# Patient Record
Sex: MALE | Race: OTHER | HISPANIC OR LATINO | URBAN - METROPOLITAN AREA
[De-identification: names, ages, dates, MRNs, and addresses within clinical notes are randomized per-mention and may not be internally consistent; named-entity substitution may affect disease eponyms.]

---

## 2018-10-20 ENCOUNTER — INPATIENT (INPATIENT)
Facility: HOSPITAL | Age: 83
LOS: 2 days | Discharge: NURSING, REHAB OR LONG-TERM CARE | DRG: 641 | End: 2018-10-23
Attending: INTERNAL MEDICINE | Admitting: INTERNAL MEDICINE
Payer: MEDICARE

## 2018-10-20 VITALS
WEIGHT: 177.91 LBS | SYSTOLIC BLOOD PRESSURE: 148 MMHG | TEMPERATURE: 98 F | DIASTOLIC BLOOD PRESSURE: 72 MMHG | HEIGHT: 66 IN | OXYGEN SATURATION: 98 % | HEART RATE: 80 BPM | RESPIRATION RATE: 18 BRPM

## 2018-10-20 RX ORDER — SODIUM CHLORIDE 9 MG/ML
1000 INJECTION INTRAMUSCULAR; INTRAVENOUS; SUBCUTANEOUS ONCE
Qty: 0 | Refills: 0 | Status: COMPLETED | OUTPATIENT
Start: 2018-10-20 | End: 2018-10-20

## 2018-10-20 RX ORDER — SODIUM CHLORIDE 9 MG/ML
3 INJECTION INTRAMUSCULAR; INTRAVENOUS; SUBCUTANEOUS ONCE
Qty: 0 | Refills: 0 | Status: COMPLETED | OUTPATIENT
Start: 2018-10-20 | End: 2018-10-20

## 2018-10-20 RX ADMIN — SODIUM CHLORIDE 3 MILLILITER(S): 9 INJECTION INTRAMUSCULAR; INTRAVENOUS; SUBCUTANEOUS at 23:56

## 2018-10-20 RX ADMIN — SODIUM CHLORIDE 1000 MILLILITER(S): 9 INJECTION INTRAMUSCULAR; INTRAVENOUS; SUBCUTANEOUS at 23:57

## 2018-10-20 NOTE — ED PROVIDER NOTE - OBJECTIVE STATEMENT
89 y/o M patient with a significant PMHx of Arthritis, BPH, Hemorrhoids, HTN and no significant PSHx presents with daughter to the ED with abdominal pain. Patient's daughter reports patient lives in assistant living and is given unknown medication. Patient's daughter states patient took medication and began experiencing constant abdominal pain for x4 days along with a waxing and waning headache. Patient endorses constipation, dry mouth and b/l leg swelling for several months. Patient notes his last BM was yesterday. Patient denies vomiting, chest pain, cough, fever, chills, bloody or dark stool and any other complaints. NKDA.

## 2018-10-20 NOTE — ED PROVIDER NOTE - MEDICAL DECISION MAKING DETAILS
Patient with several chronic complaints. Mainly concerned about abdominal pain for last x3-x4 days, associated with constipation. Patient has chronic dizziness and headache and difficulty ambulating, chronic b/l leg swelling, lungs clear, no CVA tenderness, mild tenderness to RLQ, obtain CT to r/u obstructive appendicitis vs colitis, labs and revaluate.

## 2018-10-20 NOTE — ED ADULT TRIAGE NOTE - CHIEF COMPLAINT QUOTE
BIBA FROM [facility] LIV[facility] with c/o abdominal pain x 4 days and refused to take medications this morning as per ems

## 2018-10-20 NOTE — ED PROVIDER NOTE - PROGRESS NOTE DETAILS
reexamined, feels better, CT neg. Labs w hyponatremia and hypokalemia. Will admit. MAR aware received call from the lab, K-2.3. MAR aware

## 2018-10-21 DIAGNOSIS — Z29.9 ENCOUNTER FOR PROPHYLACTIC MEASURES, UNSPECIFIED: ICD-10-CM

## 2018-10-21 DIAGNOSIS — E87.1 HYPO-OSMOLALITY AND HYPONATREMIA: ICD-10-CM

## 2018-10-21 DIAGNOSIS — M19.90 UNSPECIFIED OSTEOARTHRITIS, UNSPECIFIED SITE: ICD-10-CM

## 2018-10-21 DIAGNOSIS — E87.6 HYPOKALEMIA: ICD-10-CM

## 2018-10-21 DIAGNOSIS — N40.0 BENIGN PROSTATIC HYPERPLASIA WITHOUT LOWER URINARY TRACT SYMPTOMS: ICD-10-CM

## 2018-10-21 DIAGNOSIS — N17.9 ACUTE KIDNEY FAILURE, UNSPECIFIED: ICD-10-CM

## 2018-10-21 DIAGNOSIS — I10 ESSENTIAL (PRIMARY) HYPERTENSION: ICD-10-CM

## 2018-10-21 DIAGNOSIS — M25.551 PAIN IN RIGHT HIP: ICD-10-CM

## 2018-10-21 DIAGNOSIS — I71.4 ABDOMINAL AORTIC ANEURYSM, WITHOUT RUPTURE: ICD-10-CM

## 2018-10-21 DIAGNOSIS — M16.9 OSTEOARTHRITIS OF HIP, UNSPECIFIED: ICD-10-CM

## 2018-10-21 LAB
ALBUMIN SERPL ELPH-MCNC: 3.6 G/DL — SIGNIFICANT CHANGE UP (ref 3.5–5)
ALP SERPL-CCNC: 68 U/L — SIGNIFICANT CHANGE UP (ref 40–120)
ALT FLD-CCNC: 29 U/L DA — SIGNIFICANT CHANGE UP (ref 10–60)
ANION GAP SERPL CALC-SCNC: 10 MMOL/L — SIGNIFICANT CHANGE UP (ref 5–17)
ANION GAP SERPL CALC-SCNC: 11 MMOL/L — SIGNIFICANT CHANGE UP (ref 5–17)
ANION GAP SERPL CALC-SCNC: 11 MMOL/L — SIGNIFICANT CHANGE UP (ref 5–17)
ANION GAP SERPL CALC-SCNC: 7 MMOL/L — SIGNIFICANT CHANGE UP (ref 5–17)
ANION GAP SERPL CALC-SCNC: 8 MMOL/L — SIGNIFICANT CHANGE UP (ref 5–17)
APPEARANCE UR: CLEAR — SIGNIFICANT CHANGE UP
AST SERPL-CCNC: 37 U/L — SIGNIFICANT CHANGE UP (ref 10–40)
BACTERIA # UR AUTO: ABNORMAL /HPF
BASOPHILS # BLD AUTO: 0.1 K/UL — SIGNIFICANT CHANGE UP (ref 0–0.2)
BASOPHILS NFR BLD AUTO: 1.4 % — SIGNIFICANT CHANGE UP (ref 0–2)
BILIRUB SERPL-MCNC: 0.7 MG/DL — SIGNIFICANT CHANGE UP (ref 0.2–1.2)
BILIRUB UR-MCNC: NEGATIVE — SIGNIFICANT CHANGE UP
BUN SERPL-MCNC: 19 MG/DL — HIGH (ref 7–18)
BUN SERPL-MCNC: 19 MG/DL — HIGH (ref 7–18)
BUN SERPL-MCNC: 22 MG/DL — HIGH (ref 7–18)
BUN SERPL-MCNC: 24 MG/DL — HIGH (ref 7–18)
BUN SERPL-MCNC: 30 MG/DL — HIGH (ref 7–18)
CALCIUM SERPL-MCNC: 7.5 MG/DL — LOW (ref 8.4–10.5)
CALCIUM SERPL-MCNC: 7.6 MG/DL — LOW (ref 8.4–10.5)
CALCIUM SERPL-MCNC: 8.1 MG/DL — LOW (ref 8.4–10.5)
CALCIUM SERPL-MCNC: 8.2 MG/DL — LOW (ref 8.4–10.5)
CALCIUM SERPL-MCNC: 8.5 MG/DL — SIGNIFICANT CHANGE UP (ref 8.4–10.5)
CHLORIDE SERPL-SCNC: 81 MMOL/L — LOW (ref 96–108)
CHLORIDE SERPL-SCNC: 90 MMOL/L — LOW (ref 96–108)
CHLORIDE SERPL-SCNC: 93 MMOL/L — LOW (ref 96–108)
CHLORIDE SERPL-SCNC: 96 MMOL/L — SIGNIFICANT CHANGE UP (ref 96–108)
CHLORIDE SERPL-SCNC: 97 MMOL/L — SIGNIFICANT CHANGE UP (ref 96–108)
CHLORIDE UR-SCNC: 31 MMOL/L — LOW (ref 55–125)
CHOLEST SERPL-MCNC: 159 MG/DL — SIGNIFICANT CHANGE UP (ref 10–199)
CO2 SERPL-SCNC: 31 MMOL/L — SIGNIFICANT CHANGE UP (ref 22–31)
CO2 SERPL-SCNC: 31 MMOL/L — SIGNIFICANT CHANGE UP (ref 22–31)
CO2 SERPL-SCNC: 32 MMOL/L — HIGH (ref 22–31)
CO2 SERPL-SCNC: 33 MMOL/L — HIGH (ref 22–31)
CO2 SERPL-SCNC: 35 MMOL/L — HIGH (ref 22–31)
COLOR SPEC: YELLOW — SIGNIFICANT CHANGE UP
CREAT ?TM UR-MCNC: <13 MG/DL — SIGNIFICANT CHANGE UP
CREAT SERPL-MCNC: 0.99 MG/DL — SIGNIFICANT CHANGE UP (ref 0.5–1.3)
CREAT SERPL-MCNC: 1.05 MG/DL — SIGNIFICANT CHANGE UP (ref 0.5–1.3)
CREAT SERPL-MCNC: 1.11 MG/DL — SIGNIFICANT CHANGE UP (ref 0.5–1.3)
CREAT SERPL-MCNC: 1.19 MG/DL — SIGNIFICANT CHANGE UP (ref 0.5–1.3)
CREAT SERPL-MCNC: 1.35 MG/DL — HIGH (ref 0.5–1.3)
DIFF PNL FLD: ABNORMAL
EOSINOPHIL # BLD AUTO: 0.1 K/UL — SIGNIFICANT CHANGE UP (ref 0–0.5)
EOSINOPHIL NFR BLD AUTO: 1.7 % — SIGNIFICANT CHANGE UP (ref 0–6)
GLUCOSE BLDC GLUCOMTR-MCNC: 181 MG/DL — HIGH (ref 70–99)
GLUCOSE SERPL-MCNC: 107 MG/DL — HIGH (ref 70–99)
GLUCOSE SERPL-MCNC: 108 MG/DL — HIGH (ref 70–99)
GLUCOSE SERPL-MCNC: 138 MG/DL — HIGH (ref 70–99)
GLUCOSE SERPL-MCNC: 162 MG/DL — HIGH (ref 70–99)
GLUCOSE SERPL-MCNC: 181 MG/DL — HIGH (ref 70–99)
GLUCOSE UR QL: NEGATIVE — SIGNIFICANT CHANGE UP
HBA1C BLD-MCNC: 6.2 % — HIGH (ref 4–5.6)
HCT VFR BLD CALC: 33.3 % — LOW (ref 39–50)
HCT VFR BLD CALC: 35.9 % — LOW (ref 39–50)
HDLC SERPL-MCNC: 45 MG/DL — SIGNIFICANT CHANGE UP
HGB BLD-MCNC: 11.6 G/DL — LOW (ref 13–17)
HGB BLD-MCNC: 13 G/DL — SIGNIFICANT CHANGE UP (ref 13–17)
KETONES UR-MCNC: NEGATIVE — SIGNIFICANT CHANGE UP
LACTATE SERPL-SCNC: 1.6 MMOL/L — SIGNIFICANT CHANGE UP (ref 0.7–2)
LEUKOCYTE ESTERASE UR-ACNC: NEGATIVE — SIGNIFICANT CHANGE UP
LIDOCAIN IGE QN: 184 U/L — SIGNIFICANT CHANGE UP (ref 73–393)
LIPID PNL WITH DIRECT LDL SERPL: 96 MG/DL — SIGNIFICANT CHANGE UP
LYMPHOCYTES # BLD AUTO: 1.2 K/UL — SIGNIFICANT CHANGE UP (ref 1–3.3)
LYMPHOCYTES # BLD AUTO: 15.2 % — SIGNIFICANT CHANGE UP (ref 13–44)
MAGNESIUM SERPL-MCNC: 2.5 MG/DL — SIGNIFICANT CHANGE UP (ref 1.6–2.6)
MCHC RBC-ENTMCNC: 30.5 PG — SIGNIFICANT CHANGE UP (ref 27–34)
MCHC RBC-ENTMCNC: 31 PG — SIGNIFICANT CHANGE UP (ref 27–34)
MCHC RBC-ENTMCNC: 34.9 GM/DL — SIGNIFICANT CHANGE UP (ref 32–36)
MCHC RBC-ENTMCNC: 36.3 GM/DL — HIGH (ref 32–36)
MCV RBC AUTO: 85.4 FL — SIGNIFICANT CHANGE UP (ref 80–100)
MCV RBC AUTO: 87.5 FL — SIGNIFICANT CHANGE UP (ref 80–100)
MONOCYTES # BLD AUTO: 0.7 K/UL — SIGNIFICANT CHANGE UP (ref 0–0.9)
MONOCYTES NFR BLD AUTO: 8.8 % — SIGNIFICANT CHANGE UP (ref 2–14)
NEUTROPHILS # BLD AUTO: 5.9 K/UL — SIGNIFICANT CHANGE UP (ref 1.8–7.4)
NEUTROPHILS NFR BLD AUTO: 72.9 % — SIGNIFICANT CHANGE UP (ref 43–77)
NITRITE UR-MCNC: NEGATIVE — SIGNIFICANT CHANGE UP
OSMOLALITY SERPL: 165 MOS/KG — LOW (ref 275–300)
OSMOLALITY UR: 164 MOS/KG — SIGNIFICANT CHANGE UP (ref 50–1200)
PH UR: 7 — SIGNIFICANT CHANGE UP (ref 5–8)
PLATELET # BLD AUTO: 178 K/UL — SIGNIFICANT CHANGE UP (ref 150–400)
PLATELET # BLD AUTO: 204 K/UL — SIGNIFICANT CHANGE UP (ref 150–400)
POTASSIUM SERPL-MCNC: 2.3 MMOL/L — CRITICAL LOW (ref 3.5–5.3)
POTASSIUM SERPL-MCNC: 2.3 MMOL/L — CRITICAL LOW (ref 3.5–5.3)
POTASSIUM SERPL-MCNC: 2.4 MMOL/L — CRITICAL LOW (ref 3.5–5.3)
POTASSIUM SERPL-MCNC: 2.7 MMOL/L — CRITICAL LOW (ref 3.5–5.3)
POTASSIUM SERPL-MCNC: 3.1 MMOL/L — LOW (ref 3.5–5.3)
POTASSIUM SERPL-SCNC: 2.3 MMOL/L — CRITICAL LOW (ref 3.5–5.3)
POTASSIUM SERPL-SCNC: 2.3 MMOL/L — CRITICAL LOW (ref 3.5–5.3)
POTASSIUM SERPL-SCNC: 2.4 MMOL/L — CRITICAL LOW (ref 3.5–5.3)
POTASSIUM SERPL-SCNC: 2.7 MMOL/L — CRITICAL LOW (ref 3.5–5.3)
POTASSIUM SERPL-SCNC: 3.1 MMOL/L — LOW (ref 3.5–5.3)
POTASSIUM UR-SCNC: 3 MMOL/L — LOW (ref 25–125)
PROT SERPL-MCNC: 7.6 G/DL — SIGNIFICANT CHANGE UP (ref 6–8.3)
PROT UR-MCNC: 15
RBC # BLD: 3.8 M/UL — LOW (ref 4.2–5.8)
RBC # BLD: 4.21 M/UL — SIGNIFICANT CHANGE UP (ref 4.2–5.8)
RBC # FLD: 11.8 % — SIGNIFICANT CHANGE UP (ref 10.3–14.5)
RBC # FLD: 12 % — SIGNIFICANT CHANGE UP (ref 10.3–14.5)
RBC CASTS # UR COMP ASSIST: ABNORMAL /HPF (ref 0–2)
SODIUM SERPL-SCNC: 127 MMOL/L — LOW (ref 135–145)
SODIUM SERPL-SCNC: 132 MMOL/L — LOW (ref 135–145)
SODIUM SERPL-SCNC: 133 MMOL/L — LOW (ref 135–145)
SODIUM SERPL-SCNC: 137 MMOL/L — SIGNIFICANT CHANGE UP (ref 135–145)
SODIUM SERPL-SCNC: 137 MMOL/L — SIGNIFICANT CHANGE UP (ref 135–145)
SODIUM UR-SCNC: 40 MMOL/L — SIGNIFICANT CHANGE UP (ref 40–220)
SP GR SPEC: 1.01 — SIGNIFICANT CHANGE UP (ref 1.01–1.02)
TOTAL CHOLESTEROL/HDL RATIO MEASUREMENT: 3.5 RATIO — SIGNIFICANT CHANGE UP (ref 3.4–9.6)
TRIGL SERPL-MCNC: 90 MG/DL — SIGNIFICANT CHANGE UP (ref 10–149)
TSH SERPL-MCNC: 1.24 UU/ML — SIGNIFICANT CHANGE UP (ref 0.34–4.82)
UROBILINOGEN FLD QL: NEGATIVE — SIGNIFICANT CHANGE UP
VIT B12 SERPL-MCNC: 358 PG/ML — SIGNIFICANT CHANGE UP (ref 232–1245)
WBC # BLD: 7.1 K/UL — SIGNIFICANT CHANGE UP (ref 3.8–10.5)
WBC # BLD: 8.1 K/UL — SIGNIFICANT CHANGE UP (ref 3.8–10.5)
WBC # FLD AUTO: 7.1 K/UL — SIGNIFICANT CHANGE UP (ref 3.8–10.5)
WBC # FLD AUTO: 8.1 K/UL — SIGNIFICANT CHANGE UP (ref 3.8–10.5)
WBC UR QL: SIGNIFICANT CHANGE UP /HPF (ref 0–5)

## 2018-10-21 PROCEDURE — 74177 CT ABD & PELVIS W/CONTRAST: CPT | Mod: 26

## 2018-10-21 PROCEDURE — 99223 1ST HOSP IP/OBS HIGH 75: CPT

## 2018-10-21 PROCEDURE — 99285 EMERGENCY DEPT VISIT HI MDM: CPT | Mod: 25

## 2018-10-21 RX ORDER — ASPIRIN/CALCIUM CARB/MAGNESIUM 324 MG
81 TABLET ORAL DAILY
Qty: 0 | Refills: 0 | Status: DISCONTINUED | OUTPATIENT
Start: 2018-10-21 | End: 2018-10-23

## 2018-10-21 RX ORDER — INFLUENZA VIRUS VACCINE 15; 15; 15; 15 UG/.5ML; UG/.5ML; UG/.5ML; UG/.5ML
0.5 SUSPENSION INTRAMUSCULAR ONCE
Qty: 0 | Refills: 0 | Status: COMPLETED | OUTPATIENT
Start: 2018-10-21 | End: 2018-10-23

## 2018-10-21 RX ORDER — DEXTROSE MONOHYDRATE, SODIUM CHLORIDE, AND POTASSIUM CHLORIDE 50; .745; 4.5 G/1000ML; G/1000ML; G/1000ML
1000 INJECTION, SOLUTION INTRAVENOUS
Qty: 0 | Refills: 0 | Status: DISCONTINUED | OUTPATIENT
Start: 2018-10-21 | End: 2018-10-21

## 2018-10-21 RX ORDER — TAMSULOSIN HYDROCHLORIDE 0.4 MG/1
0.4 CAPSULE ORAL AT BEDTIME
Qty: 0 | Refills: 0 | Status: DISCONTINUED | OUTPATIENT
Start: 2018-10-21 | End: 2018-10-23

## 2018-10-21 RX ORDER — FINASTERIDE 5 MG/1
5 TABLET, FILM COATED ORAL DAILY
Qty: 0 | Refills: 0 | Status: DISCONTINUED | OUTPATIENT
Start: 2018-10-21 | End: 2018-10-23

## 2018-10-21 RX ORDER — ATORVASTATIN CALCIUM 80 MG/1
10 TABLET, FILM COATED ORAL AT BEDTIME
Qty: 0 | Refills: 0 | Status: DISCONTINUED | OUTPATIENT
Start: 2018-10-21 | End: 2018-10-23

## 2018-10-21 RX ORDER — PANTOPRAZOLE SODIUM 20 MG/1
40 TABLET, DELAYED RELEASE ORAL
Qty: 0 | Refills: 0 | Status: DISCONTINUED | OUTPATIENT
Start: 2018-10-21 | End: 2018-10-23

## 2018-10-21 RX ORDER — HEPARIN SODIUM 5000 [USP'U]/ML
5000 INJECTION INTRAVENOUS; SUBCUTANEOUS EVERY 8 HOURS
Qty: 0 | Refills: 0 | Status: DISCONTINUED | OUTPATIENT
Start: 2018-10-21 | End: 2018-10-23

## 2018-10-21 RX ORDER — SODIUM CHLORIDE 9 MG/ML
1000 INJECTION, SOLUTION INTRAVENOUS
Qty: 0 | Refills: 0 | Status: DISCONTINUED | OUTPATIENT
Start: 2018-10-21 | End: 2018-10-21

## 2018-10-21 RX ORDER — POTASSIUM CHLORIDE 20 MEQ
10 PACKET (EA) ORAL
Qty: 0 | Refills: 0 | Status: COMPLETED | OUTPATIENT
Start: 2018-10-21 | End: 2018-10-21

## 2018-10-21 RX ORDER — LIDOCAINE 4 G/100G
1 CREAM TOPICAL DAILY
Qty: 0 | Refills: 0 | Status: DISCONTINUED | OUTPATIENT
Start: 2018-10-21 | End: 2018-10-23

## 2018-10-21 RX ORDER — POLYETHYLENE GLYCOL 3350 17 G/17G
17 POWDER, FOR SOLUTION ORAL DAILY
Qty: 0 | Refills: 0 | Status: DISCONTINUED | OUTPATIENT
Start: 2018-10-21 | End: 2018-10-23

## 2018-10-21 RX ORDER — POTASSIUM CHLORIDE 20 MEQ
40 PACKET (EA) ORAL EVERY 4 HOURS
Qty: 0 | Refills: 0 | Status: COMPLETED | OUTPATIENT
Start: 2018-10-21 | End: 2018-10-21

## 2018-10-21 RX ORDER — POTASSIUM CHLORIDE 20 MEQ
40 PACKET (EA) ORAL EVERY 4 HOURS
Qty: 0 | Refills: 0 | Status: DISCONTINUED | OUTPATIENT
Start: 2018-10-21 | End: 2018-10-22

## 2018-10-21 RX ORDER — POTASSIUM CHLORIDE 20 MEQ
40 PACKET (EA) ORAL EVERY 4 HOURS
Qty: 0 | Refills: 0 | Status: DISCONTINUED | OUTPATIENT
Start: 2018-10-21 | End: 2018-10-21

## 2018-10-21 RX ORDER — DOCUSATE SODIUM 100 MG
100 CAPSULE ORAL THREE TIMES A DAY
Qty: 0 | Refills: 0 | Status: DISCONTINUED | OUTPATIENT
Start: 2018-10-21 | End: 2018-10-23

## 2018-10-21 RX ORDER — ACETAMINOPHEN 500 MG
1000 TABLET ORAL ONCE
Qty: 0 | Refills: 0 | Status: COMPLETED | OUTPATIENT
Start: 2018-10-22 | End: 2018-10-22

## 2018-10-21 RX ORDER — ACETAMINOPHEN 500 MG
1000 TABLET ORAL ONCE
Qty: 0 | Refills: 0 | Status: COMPLETED | OUTPATIENT
Start: 2018-10-21 | End: 2018-10-21

## 2018-10-21 RX ORDER — SODIUM CHLORIDE 9 MG/ML
1000 INJECTION INTRAMUSCULAR; INTRAVENOUS; SUBCUTANEOUS ONCE
Qty: 0 | Refills: 0 | Status: COMPLETED | OUTPATIENT
Start: 2018-10-21 | End: 2018-10-21

## 2018-10-21 RX ORDER — POTASSIUM CHLORIDE 20 MEQ
40 PACKET (EA) ORAL ONCE
Qty: 0 | Refills: 0 | Status: COMPLETED | OUTPATIENT
Start: 2018-10-21 | End: 2018-10-21

## 2018-10-21 RX ORDER — SENNA PLUS 8.6 MG/1
2 TABLET ORAL AT BEDTIME
Qty: 0 | Refills: 0 | Status: DISCONTINUED | OUTPATIENT
Start: 2018-10-21 | End: 2018-10-23

## 2018-10-21 RX ORDER — SODIUM CHLORIDE 9 MG/ML
1000 INJECTION, SOLUTION INTRAVENOUS
Qty: 0 | Refills: 0 | Status: DISCONTINUED | OUTPATIENT
Start: 2018-10-21 | End: 2018-10-22

## 2018-10-21 RX ORDER — AMLODIPINE BESYLATE 2.5 MG/1
5 TABLET ORAL DAILY
Qty: 0 | Refills: 0 | Status: DISCONTINUED | OUTPATIENT
Start: 2018-10-21 | End: 2018-10-23

## 2018-10-21 RX ADMIN — Medication 100 MILLIEQUIVALENT(S): at 04:19

## 2018-10-21 RX ADMIN — LIDOCAINE 1 PATCH: 4 CREAM TOPICAL at 21:41

## 2018-10-21 RX ADMIN — Medication 1000 MILLIGRAM(S): at 16:39

## 2018-10-21 RX ADMIN — Medication 100 MILLIGRAM(S): at 10:57

## 2018-10-21 RX ADMIN — Medication 1000 MILLIGRAM(S): at 23:58

## 2018-10-21 RX ADMIN — Medication 40 MILLIEQUIVALENT(S): at 01:38

## 2018-10-21 RX ADMIN — Medication 400 MILLIGRAM(S): at 21:39

## 2018-10-21 RX ADMIN — DEXTROSE MONOHYDRATE, SODIUM CHLORIDE, AND POTASSIUM CHLORIDE 75 MILLILITER(S): 50; .745; 4.5 INJECTION, SOLUTION INTRAVENOUS at 10:45

## 2018-10-21 RX ADMIN — Medication 100 MILLIGRAM(S): at 13:18

## 2018-10-21 RX ADMIN — FINASTERIDE 5 MILLIGRAM(S): 5 TABLET, FILM COATED ORAL at 11:01

## 2018-10-21 RX ADMIN — HEPARIN SODIUM 5000 UNIT(S): 5000 INJECTION INTRAVENOUS; SUBCUTANEOUS at 13:18

## 2018-10-21 RX ADMIN — HEPARIN SODIUM 5000 UNIT(S): 5000 INJECTION INTRAVENOUS; SUBCUTANEOUS at 21:39

## 2018-10-21 RX ADMIN — SODIUM CHLORIDE 250 MILLILITER(S): 9 INJECTION, SOLUTION INTRAVENOUS at 19:55

## 2018-10-21 RX ADMIN — Medication 100 MILLIGRAM(S): at 21:40

## 2018-10-21 RX ADMIN — TAMSULOSIN HYDROCHLORIDE 0.4 MILLIGRAM(S): 0.4 CAPSULE ORAL at 21:40

## 2018-10-21 RX ADMIN — Medication 81 MILLIGRAM(S): at 11:01

## 2018-10-21 RX ADMIN — Medication 40 MILLIEQUIVALENT(S): at 23:58

## 2018-10-21 RX ADMIN — SODIUM CHLORIDE 1000 MILLILITER(S): 9 INJECTION INTRAMUSCULAR; INTRAVENOUS; SUBCUTANEOUS at 01:38

## 2018-10-21 RX ADMIN — POLYETHYLENE GLYCOL 3350 17 GRAM(S): 17 POWDER, FOR SOLUTION ORAL at 10:57

## 2018-10-21 RX ADMIN — PANTOPRAZOLE SODIUM 40 MILLIGRAM(S): 20 TABLET, DELAYED RELEASE ORAL at 07:28

## 2018-10-21 RX ADMIN — Medication 40 MILLIEQUIVALENT(S): at 14:46

## 2018-10-21 RX ADMIN — Medication 400 MILLIGRAM(S): at 15:25

## 2018-10-21 RX ADMIN — Medication 40 MILLIEQUIVALENT(S): at 19:55

## 2018-10-21 RX ADMIN — SODIUM CHLORIDE 250 MILLILITER(S): 9 INJECTION, SOLUTION INTRAVENOUS at 15:03

## 2018-10-21 RX ADMIN — LIDOCAINE 1 PATCH: 4 CREAM TOPICAL at 17:52

## 2018-10-21 RX ADMIN — Medication 40 MILLIEQUIVALENT(S): at 17:52

## 2018-10-21 RX ADMIN — Medication 100 MILLIEQUIVALENT(S): at 05:23

## 2018-10-21 RX ADMIN — Medication 40 MILLIEQUIVALENT(S): at 10:59

## 2018-10-21 RX ADMIN — ATORVASTATIN CALCIUM 10 MILLIGRAM(S): 80 TABLET, FILM COATED ORAL at 21:39

## 2018-10-21 RX ADMIN — Medication 40 MILLIEQUIVALENT(S): at 07:28

## 2018-10-21 RX ADMIN — Medication 100 MILLIEQUIVALENT(S): at 01:39

## 2018-10-21 RX ADMIN — SENNA PLUS 2 TABLET(S): 8.6 TABLET ORAL at 21:39

## 2018-10-21 NOTE — H&P ADULT - HISTORY OF PRESENT ILLNESS
Pt is an 89 y/o M, from Assisted living, w/ PMHx of Arthritis, BPH, Hemorrhoids, HTN who presents with daughter to the ED with abdominal pain Pt is an 88 y/o M, from Assisted living, w/ PMHx of Arthritis, BPH, Constipation, Hemorrhoids, and HTN who presents with daughter to the ED with abdominal pain x 1 week.  Pt states he suffers from chronic constipation at baseline, and typically will consume water to assist with BM's, but lately he has been having difficulty defecating.  He states his last real BM was 1 week prior, and he has been going intermittently since then.  He states his hemorrhoids make it more difficult for him to go.  Pt also reports chronic Lower back pain radiating down his Rt hip for several years.  He describes it as sharp, and positional, mainly on standing.  He denies any trauma, but states he has difficulty ambulating due to the pain.  He takes tylenol at home, but it only provides mild relief.  Pt also describes LE edema, that has been resolving.   Pt denies CP, palpitations, SOB, dizziness, HA, blurred vision, slurred speech, extremity numbness/ weakness, fever, N/V, rash, urinary symptoms, or syncope.     In the ED, pt presents in no acute distress, vitals WNL, and EKG NSR

## 2018-10-21 NOTE — ED ADULT NURSE NOTE - ED STAT RN HANDOFF DETAILS
report given to RAYO Mensah on [address] [place] room 50A in stable condition for continuation of care. pt a&ox3, admitted for hyponatremia and hypokalemia. 20G RAC and 22G right hand.

## 2018-10-21 NOTE — H&P ADULT - RS GEN PE MLT RESP DETAILS PC
breath sounds equal/good air movement/respirations non-labored/airway patent/no rales/no wheezes/no rhonchi/clear to auscultation bilaterally

## 2018-10-21 NOTE — H&P ADULT - NSHPPHYSICALEXAM_GEN_ALL_CORE
Vital Signs Last 24 Hrs  T(C): 36.3 (21 Oct 2018 00:44), Max: 36.9 (20 Oct 2018 22:53)  T(F): 97.3 (21 Oct 2018 00:44), Max: 98.4 (20 Oct 2018 22:53)  HR: 85 (21 Oct 2018 00:44) (80 - 85)  BP: 133/61 (21 Oct 2018 00:44) (133/61 - 148/72)  RR: 18 (21 Oct 2018 00:44) (18 - 18)  SpO2: 96% (21 Oct 2018 00:44) (96% - 98%)

## 2018-10-21 NOTE — H&P ADULT - PROBLEM SELECTOR PLAN 8
[] Previous VTE                                                3  [] Thrombophilia                                             2  [] Lower limb paralysis                                   2    [] Current Cancer                                             2   [x] Immobilization > 24 hrs                              1  [] ICU/CCU stay > 24 hours                             1  [x] Age > 60                                                         1    IMPROVE VTE Score: 2; heparin sub q for DVT prophy

## 2018-10-21 NOTE — H&P ADULT - PROBLEM SELECTOR PLAN 5
-c/w flomax therapy -Holding Lisinopril and Hctz for now due to GUILLERMINA and Hypokalemia   -Will start Amlodipine 5 mg, and low dose hydralazine for now  -Resume lisinopril when clinically appropriate.

## 2018-10-21 NOTE — H&P ADULT - PROBLEM SELECTOR PLAN 4
-Holding Lisinopril and Hctz for now due to GUILLERMINA and Hypokalemia   -Will start Amlodipine 5 mg, and low dose hydralazine for now  -Resume lisinopril when clinically appropriate. -Severe Rt hip arthrosis   -Likely the cause of patients difficulty ambulating   -Will begin a pain regimen of tylenol, percocet, and gabapentin   -Pain mgmt consult  -Pt may benefit from ortho consult for hip replacement surgery

## 2018-10-21 NOTE — ED ADULT NURSE NOTE - NSIMPLEMENTINTERV_GEN_ALL_ED
Implemented All Fall with Harm Risk Interventions:  [place] to call system. Call bell, personal items and telephone within reach. Instruct patient to call for assistance. Room bathroom lighting operational. Non-slip footwear when patient is off stretcher. Physically safe environment: no spills, clutter or unnecessary equipment. Stretcher in lowest position, wheels locked, appropriate side rails in place. Provide visual cue, wrist band, yellow gown, etc. Monitor gait and stability. Monitor for mental status changes and reorient to person, place, and time. Review medications for side effects contributing to fall risk. Reinforce activity limits and safety measures with patient and family. Provide visual clues: red socks.

## 2018-10-21 NOTE — CONSULT NOTE ADULT - ASSESSMENT
A/P:  1.HYPONATREMIA: r/o secondary to high adh state secondary to chronic Rt HIP/back pain plus excess po liquid intake plus Hctz induced , now Na is normal but Na has been corrected too fast  -suggest to lower Na level next 4 to 6 hrs and keep Na around 133 to 134 by midnight  -add d5w at 250 ml per hr x 4 hrs only  -F/u Na level in 4 hrs  -please do not correct Na level >8 meq in 24 hrs  2.HYPOKALEMIA: r/o secondary to Hctz  -suggest to replace po and iv kcl prn  -keep k >4 and <5  -f/u k level daily  3.Alkalosis:Mild, r/o secondary to caco3  -avoid caco3  -f/u co2 level daily  4.HTN: bp is acceptble  -continue tthe current bp meds  -keep bp<140/80  5.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

## 2018-10-21 NOTE — H&P ADULT - ATTENDING COMMENTS
patient was seen and examined along with admitting resident this am  above reviewed and agreed   care plan discussed in details   dr hazel informed for renal consult

## 2018-10-21 NOTE — H&P ADULT - ASSESSMENT
Pt is an 88 y/o M, from Assisted living, w/ PMHx of Arthritis, BPH, Constipation, Hemorrhoids, and HTN who presents with daughter to the ED with abdominal pain x 1 week.  In the ED, pt presents in no acute distress, vitals WNL, and EKG NSR.  Pt remains afebrile w/o leukocytosis.  Remaining labs sig for Na of 127, and K+ of 2.4.  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      Pt will be admitted to tele for electrolyte deficiency, management of constipation, and hip pain.

## 2018-10-21 NOTE — CONSULT NOTE ADULT - SUBJECTIVE AND OBJECTIVE BOX
AALIYAH KELLY  89y  Male      Patient is a 89y old  Male who presents with a chief complaint of abd pain (21 Oct 2018 04:39). Pt is an 88 y/o M, from Assisted living, w/ PMHx of Arthritis, BPH, Constipation, Hemorrhoids, and HTN who presents with daughter to the ED with abdominal pain x 1 week.  Pt states he suffers from chronic constipation at baseline, and typically will consume water to assist with BM's, but lately he has been having difficulty defecating.  He states his last real BM was 1 week prior, and he has been going intermittently since then.  He states his hemorrhoids make it more difficult for him to go.  Pt also reports chronic Lower back pain radiating down his Rt hip for several years.  He describes it as sharp, and positional, mainly on standing.  He denies any trauma, but states he has difficulty ambulating due to the pain.  He takes tylenol at home, but it only provides mild relief.  Pt also describes LE edema, that has been resolving.   Pt denies CP, palpitations, SOB, dizziness, HA, blurred vision, slurred speech, extremity numbness/ weakness, fever, N/V, rash, urinary symptoms, or syncope.     89 year old male admitted with abdominal pain.  Pain for 1 week.  Pt has history of constipation and has not had a bm in a week.  Pt also complaining of right hip pain. Pain at right hip pand does not radiate down right leg.  No nausea or vomiting.  No chest pain or sob.  Upon admission, pt found to be hyponatremic and hypokalemic.  Pt put on fluids and given potassium supplements.         PAST MEDICAL/SURGICAL HISTORY  PAST MEDICAL & SURGICAL HISTORY:  Hemorrhoids  Arthritis  HTN (hypertension)  BPH (benign prostatic hyperplasia)  No significant past surgical history      REVIEW OF SYSTEMS:  CONSTITUTIONAL: No fever, weight loss, or fatigue  RESPIRATORY: No cough, wheezing, chills or hemoptysis; No shortness of breath  CARDIOVASCULAR: No chest pain, palpitations, + bilateral leg swelling  GASTROINTESTINAL: + abdominal pain + distention   GENITOURINARY: No dysuria, frequency, hematuria, or incontinence  NEUROLOGICAL: No headaches, memory loss, loss of strength, numbness, or tremors  MUSCULOSKELETAL: + right hip pain      T(C): 37.1 (10-21-18 @ 11:34), Max: 37.1 (10-21-18 @ 11:34)  HR: 84 (10-21-18 @ 11:34) (74 - 85)  BP: 129/57 (10-21-18 @ 11:34) (113/50 - 148/81)  RR: 18 (10-21-18 @ 11:34) (18 - 18)  SpO2: 95% (10-21-18 @ 11:34) (94% - 98%)  Wt(kg): --Vital Signs Last 24 Hrs  T(C): 37.1 (21 Oct 2018 11:34), Max: 37.1 (21 Oct 2018 11:34)  T(F): 98.7 (21 Oct 2018 11:34), Max: 98.7 (21 Oct 2018 11:34)  HR: 84 (21 Oct 2018 11:34) (74 - 85)  BP: 129/57 (21 Oct 2018 11:34) (113/50 - 148/81)  BP(mean): --  RR: 18 (21 Oct 2018 11:34) (18 - 18)  SpO2: 95% (21 Oct 2018 11:34) (94% - 98%)    PHYSICAL EXAM:  GENERAL: NAD, well-groomed, well-developed  NERVOUS SYSTEM:  Alert & Oriented X3, Good concentration;   CHEST/LUNG: Clear to percussion bilaterally; No rales, rhonchi, wheezing, or rubs  HEART: Regular rate and rhythm; No murmurs, rubs, or gallops  ABDOMEN: distended mild diffuse tenderness   EXTREMITIES:  2+ Peripheral Pulses, No clubbing, cyanosis, + bilateral le edema   MUSCULOSKELETAL: + right hi pain      Consultant(s) Notes Reviewed:  [x ] YES  [ ] NO  Care Discussed with Consultants/Other Providers [ x] YES  [ ] NO  ISTOP [ ] Yes  [  ] No      LABS:  CBC   10-21-18 @ 05:40  Hematcorit 33.3  Hemoglobin 11.6  Mean Cell Hemoglobin 30.5  Platelet Count-Automated 178  RBC Count 3.80  Red Cell Distrib Width 12.0  Wbc Count 7.1  10-21-18 @ 00:09  Hematcorit 35.9  Hemoglobin 13.0  Mean Cell Hemoglobin 31.0  Platelet Count-Automated 204  RBC Count 4.21  Red Cell Distrib Width 11.8  Wbc Count 8.1      BMP  10-21-18 @ 12:45  Anion Gap. Serum 10  Blood Urea Nitrogen,Serm 19  Calcium, Total Serum 8.2  Carbon Dioxide, Serum 31  Chloride, Serum 96  Creatinine, Serum 1.19  eGFR in  62  eGFR in Non Afican American 54  Gloucose, serum 162  Potassium, Serum 2.7  Sodium, Serum 137              10-21-18 @ 06:45  Anion Gap. Serum 8  Blood Urea Nitrogen,Serm 22  Calcium, Total Serum 7.5  Carbon Dioxide, Serum 32  Chloride, Serum 93  Creatinine, Serum 0.99  eGFR in  78  eGFR in Non Afican American 67  Gloucose, serum 107  Potassium, Serum 2.3  Sodium, Serum 133              10-21-18 @ 05:40  Anion Gap. Serum 11  Blood Urea Nitrogen,Serm 24  Calcium, Total Serum 7.6  Carbon Dioxide, Serum 31  Chloride, Serum 90  Creatinine, Serum 1.11  eGFR in  68  eGFR in Non Afican American 59  Gloucose, serum 108  Potassium, Serum 2.3  Sodium, Serum 132              10-21-18 @ 00:09  Anion Gap. Serum 11  Blood Urea Nitrogen,Serm 30  Calcium, Total Serum 8.5  Carbon Dioxide, Serum 35  Chloride, Serum 81  Creatinine, Serum 1.35  eGFR in  54  eGFR in Non Afican American 46  Gloucose, serum 138  Potassium, Serum 2.4  Sodium, Serum 127                  CMP  10-21-18 @ 12:45  Simin Aminotransferase(ALT/SGPT)--  Albumin, Serum --  Alkaline Phosphatase, Serum --  Anion Gap, Serum 10  Aspartate Aminotransferase (AST/SGOT)--  Bilirubin Total, Serum --  Blood Urea Nitrogen, Serum 19  Calcium,Total Serum 8.2  Carbon Dioxide, Serum 31  Chloride, Serum 96  Creatinine, Serum 1.19  eGFR if  62  eGFR if Non African American 54  Glucose, Serum 162  Potassium, Serum 2.7  Protein Total, Serum --  Sodium, Serum 137                      10-21-18 @ 06:45  Simin Aminotransferase(ALT/SGPT)--  Albumin, Serum --  Alkaline Phosphatase, Serum --  Anion Gap, Serum 8  Aspartate Aminotransferase (AST/SGOT)--  Bilirubin Total, Serum --  Blood Urea Nitrogen, Serum 22  Calcium,Total Serum 7.5  Carbon Dioxide, Serum 32  Chloride, Serum 93  Creatinine, Serum 0.99  eGFR if  78  eGFR if Non African American 67  Glucose, Serum 107  Potassium, Serum 2.3  Protein Total, Serum --  Sodium, Serum 133                      10-21-18 @ 05:40  Simin Aminotransferase(ALT/SGPT)--  Albumin, Serum --  Alkaline Phosphatase, Serum --  Anion Gap, Serum 11  Aspartate Aminotransferase (AST/SGOT)--  Bilirubin Total, Serum --  Blood Urea Nitrogen, Serum 24  Calcium,Total Serum 7.6  Carbon Dioxide, Serum 31  Chloride, Serum 90  Creatinine, Serum 1.11  eGFR if  68  eGFR if Non African American 59  Glucose, Serum 108  Potassium, Serum 2.3  Protein Total, Serum --  Sodium, Serum 132                      10-21-18 @ 00:09  Simin Aminotransferase(ALT/SGPT)29  Albumin, Serum 3.6  Alkaline Phosphatase, Serum 68  Anion Gap, Serum 11  Aspartate Aminotransferase (AST/SGOT)37  Bilirubin Total, Serum 0.7  Blood Urea Nitrogen, Serum 30  Calcium,Total Serum 8.5  Carbon Dioxide, Serum 35  Chloride, Serum 81  Creatinine, Serum 1.35  eGFR if  54  eGFR if Non African American 46  Glucose, Serum 138  Potassium, Serum 2.4  Protein Total, Serum 7.6  Sodium, Serum 127                          PT/INR      Amylase/Lipase  10-21-18 @ 00:09  Amylase, Serum Total --  Lipase, Serum 184            RADIOLOGY & ADDITIONAL TESTS:    Imaging Personally Reviewed:  [ ] YES  [ ] NO

## 2018-10-21 NOTE — ED ADULT NURSE NOTE - CHIEF COMPLAINT QUOTE
BIBA FROM [facility] LIVING with c/o abdominal pain x 4 days and refused to take medications this morning as per ems

## 2018-10-21 NOTE — H&P ADULT - PROBLEM SELECTOR PLAN 1
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 Shore

## 2018-10-21 NOTE — H&P ADULT - PROBLEM SELECTOR PLAN 7
[] Previous VTE                                                3  [] Thrombophilia                                             2  [] Lower limb paralysis                                   2    [] Current Cancer                                             2   [x] Immobilization > 24 hrs                              1  [] ICU/CCU stay > 24 hours                             1  [x] Age > 60                                                         1    IMPROVE VTE Score: 2; heparin sub q for DVT prophy -c/w flomax therapy

## 2018-10-21 NOTE — CONSULT NOTE ADULT - SUBJECTIVE AND OBJECTIVE BOX
Patient is a 89y Male whom presented to the hospital with     HPI:  Pt is an 88 y/o M, from Assisted living, w/ PMHx of Arthritis, BPH, Constipation, Hemorrhoids, and HTN who presents with daughter to the ED with abdominal pain x 1 week.  Pt states he suffers from chronic constipation at baseline, and typically will consume water to assist with BM's, but lately he has been having difficulty defecating.  He states his last real BM was 1 week prior, and he has been going intermittently since then.  He states his hemorrhoids make it more difficult for him to go.  Pt also reports chronic Lower back pain radiating down his Rt hip for several years.  He describes it as sharp, and positional, mainly on standing.  He denies any trauma, but states he has difficulty ambulating due to the pain.  He takes tylenol at home, but it only provides mild relief.  Pt also describes LE edema, that has been resolving.   Pt denies CP, palpitations, SOB, dizziness, HA, blurred vision, slurred speech, extremity numbness/ weakness, fever, N/V, rash, urinary symptoms, or syncope.     In the ED, pt presents in no acute distress, vitals WNL, and EKG NSR (21 Oct 2018 04:39)    pts current chart reviewed and case discussed with resident  pt denies pmh of renal ds, proteinuria, renal stones, recurrent uti or nephrotic edema or nephrotic syndrome  pt give pmh of retinopathy and s/p laser treatment  pt denies Nsaids use or otc other nephrotoxic supplements  PAST MEDICAL & SURGICAL HISTORY:  Hemorrhoids  Arthritis  HTN (hypertension)  BPH (benign prostatic hyperplasia)  No significant past surgical history      Home Medications: Reviewed    MEDICATIONS  (STANDING):  amLODIPine   Tablet 5 milliGRAM(s) Oral daily  aspirin  chewable 81 milliGRAM(s) Oral daily  atorvastatin 10 milliGRAM(s) Oral at bedtime  docusate sodium 100 milliGRAM(s) Oral three times a day  finasteride 5 milliGRAM(s) Oral daily  heparin  Injectable 5000 Unit(s) SubCutaneous every 8 hours  influenza   Vaccine 0.5 milliLiter(s) IntraMuscular once  pantoprazole    Tablet 40 milliGRAM(s) Oral before breakfast  polyethylene glycol 3350 17 Gram(s) Oral daily  potassium chloride    Tablet ER 40 milliEquivalent(s) Oral every 4 hours  senna 2 Tablet(s) Oral at bedtime  tamsulosin 0.4 milliGRAM(s) Oral at bedtime    MEDICATIONS  (PRN):      Allergies    No Known Allergies    Intolerances        SOCIAL HISTORY:  Alcohol use [X ] No  [ ] Yes  Smoking  [ X] No  [ ] Yes  Drug Abuse [X ] No  [ ] Yes  Tattoo [X ] No  [ ] Yes  History of Blood transfusion [ X] No  [ ] Yes    FAMILY HISTORY:  Family history of heart disease (Father)      Diabetes [ ]  Hypertension [ ]  Kidney Stones [ ]  Heart Disease [ ]  Hyperlipidemia [ ]  Cancer [ ]    REVIEW OF SYSTEMS:  CONSTITUTIONAL: No weakness, fevers or chills  EYES/ENT: No visual changes;  No vertigo or throat pain   NECK: No pain or stiffness  RESPIRATORY: No cough, wheezing, hemoptysis; No shortness of breath  CARDIOVASCULAR: No chest pain or palpitations  GASTROINTESTINAL: No abdominal or epigastric pain. No nausea, vomiting, or hematemesis; No diarrhea or constipation. No melena or hematochezia.  GENITOURINARY: No dysuria, frequency or hematuria  NEUROLOGICAL: No numbness or weakness  SKIN: No itching, burning, rashes, or lesions   All other review of systems is negative unless indicated above    Vital Signs  T(F): 98.7 (10-21-18 @ 11:34), Max: 98.7 (10-21-18 @ 11:34)  HR: 84 (10-21-18 @ 11:34) (74 - 85)  BP: 129/57 (10-21-18 @ 11:34) (113/50 - 148/81)  ABP: --  RR: 18 (10-21-18 @ 11:34) (18 - 18)  SpO2: 95% (10-21-18 @ 11:34) (94% - 98%)  Wt(kg): --  CVP(cm H2O): --  CO: --  PCWP: --    I and O's:    10-21 @ 07:01  -  10-21 @ 13:47  --------------------------------------------------------  IN:  Total IN: 0 mL    OUT:    Voided: 550 mL  Total OUT: 550 mL    Total NET: -550 mL        Daily Height in cm: 167.64 (20 Oct 2018 22:53)    Daily     PHYSICAL EXAM:  Constitutional: well developed, well nourished  and in nad  HEENT: PERRLA,  no icteric sclera and mild pallor of conjunctiva noted  Neck: No JVD, thyromegaly or adenopathy  Respiratory: reduced air entry lower lungs with no rales, wheezing or rhonchi  Cardiovascular: S1 and S2 normally heard  Gastrointestinal: soft, nondistended, nontender and normal bowel sounds heard  Extremities: No peripheral edema or cyanosis  Neurological: A/O x 3, no focal deficits  Psychiatric: Normal mood, normal affect  : No flank tenderness  Skin: No rashes        LABS:                        11.6   7.1   )-----------( 178      ( 21 Oct 2018 05:40 )             33.3           10-21    137  |  96  |  19<H>  ----------------------------<  162<H>  2.7<LL>   |  31  |  1.19  10-21    133<L>  |  93<L>  |  22<H>  ----------------------------<  107<H>  2.3<LL>   |  32<H>  |  0.99  10-21    132<L>  |  90<L>  |  24<H>  ----------------------------<  108<H>  2.3<LL>   |  31  |  1.11  10-21    127<L>  |  81<L>  |  30<H>  ----------------------------<  138<H>  2.4<LL>   |  35<H>  |  1.35<H>    Ca    8.2<L>      21 Oct 2018 12:45  Ca    7.5<L>      21 Oct 2018 06:45  Ca    7.6<L>      21 Oct 2018 05:40  Ca    8.5      21 Oct 2018 00:09    TPro  7.6  /  Alb  3.6  /  TBili  0.7  /  DBili  x   /  AST  37  /  ALT  29  /  AlkPhos  68  10-21          URINE STUDIES:  Urinalysis Basic - ( 21 Oct 2018 00:09 )    Color: Yellow / Appearance: Clear / S.010 / pH: x  Gluc: x / Ketone: Negative  / Bili: Negative / Urobili: Negative   Blood: x / Protein: 15 / Nitrite: Negative   Leuk Esterase: Negative / RBC: 2-5 /HPF / WBC 0-2 /HPF   Sq Epi: x / Non Sq Epi: x / Bacteria: Trace /HPF        Sodium, Random Urine: 40 mmol/L (10-21-18 @ 05:40)  Osmolality, Random Urine: 164 mos/kg (10-21-18 @ 05:40)  Potassium, Random Urine: 3 mmol/L (10-21-18 @ 05:40)  Creatinine, Random Urine: <13 mg/dL (10-21-18 @ 05:40)  Chloride, Random Urine: 31 mmol/L (10-21-18 @ 05:40)                RADIOLOGY & ADDITIONAL STUDIES: Patient is a 89y Male whom presented to the hospital with abdominal pain and back pain, noted to have Hyponatremia, abnormal renal function and Hypokalemia.    HPI:  Pt is an 88 y/o M, from Assisted living, w/ PMHx of Arthritis, BPH, Constipation, Hemorrhoids, and HTN who presents with daughter to the ED with abdominal pain x 1 week.  Pt states he suffers from chronic constipation at baseline, and typically will consume water to assist with BM's, but lately he has been having difficulty defecating.  He states his last real BM was 1 week prior, and he has been going intermittently since then.  He states his hemorrhoids make it more difficult for him to go.  Pt also reports chronic Lower back pain radiating down his Rt hip for several years.  He describes it as sharp, and positional, mainly on standing.  He denies any trauma, but states he has difficulty ambulating due to the pain.  He takes tylenol at home, but it only provides mild relief.  Pt also describes LE edema, that has been resolving.   Pt denies CP, palpitations, SOB, dizziness, HA, blurred vision, slurred speech, extremity numbness/ weakness, fever, N/V, rash, urinary symptoms, or syncope.     In the ED, pt presents in no acute distress, vitals WNL, and EKG NSR (21 Oct 2018 04:39)  Renal HPI:Renal HPI is obtained from  at bedside  pts current chart reviewed and case discussed with resident  pt denies pmh of renal ds, proteinuria, renal stones, recurrent uti or nephrotic edema or nephrotic syndrome  pt denies Nsaids use or otc other nephrotoxic supplements recently  pt was on Hctz and Lisinopril at home  pt has been having chronic back pain/rt.hip pain and hx of arthritis  pt currently denies cp,sob,skin rash,fever, gu symptons or leg edema    PAST MEDICAL & SURGICAL HISTORY:  Hemorrhoids  Arthritis  HTN (hypertension)  BPH (benign prostatic hyperplasia)   Past surgical history   s/p hernia repair  S/P Cholecystectomy/CBD stent ?    Home Medications: Reviewed    MEDICATIONS  (STANDING):  amLODIPine   Tablet 5 milliGRAM(s) Oral daily  aspirin  chewable 81 milliGRAM(s) Oral daily  atorvastatin 10 milliGRAM(s) Oral at bedtime  docusate sodium 100 milliGRAM(s) Oral three times a day  finasteride 5 milliGRAM(s) Oral daily  heparin  Injectable 5000 Unit(s) SubCutaneous every 8 hours  influenza   Vaccine 0.5 milliLiter(s) IntraMuscular once  pantoprazole    Tablet 40 milliGRAM(s) Oral before breakfast  polyethylene glycol 3350 17 Gram(s) Oral daily  potassium chloride    Tablet ER 40 milliEquivalent(s) Oral every 4 hours  senna 2 Tablet(s) Oral at bedtime  tamsulosin 0.4 milliGRAM(s) Oral at bedtime    MEDICATIONS  (PRN):      Allergies    No Known Allergies    Intolerances        SOCIAL HISTORY:  Alcohol use [X ] No  [ ] Yes  Smoking  [ X] No  [ ] Yes  Drug Abuse [X ] No  [ ] Yes  Tattoo [X ] No  [ ] Yes  History of Blood transfusion [ X] No  [ ] Yes    FAMILY HISTORY:  Family history of heart disease (Father)        REVIEW OF SYSTEMS:  CONSTITUTIONAL: No weakness, fevers or chills  EYES/ENT: No visual changes;  No vertigo or throat pain   NECK: No pain or stiffness  RESPIRATORY: No cough, wheezing, hemoptysis; No shortness of breath  CARDIOVASCULAR: No chest pain or palpitations  GASTROINTESTINAL: No abdominal or epigastric pain. No nausea, vomiting, or hematemesis; No diarrhea or constipation. No melena or hematochezia.  GENITOURINARY: No dysuria, frequency or hematuria  NEUROLOGICAL: No numbness or weakness  SKIN: No itching, burning, rashes, or lesions   All other review of systems is negative unless indicated above    Vital Signs  T(F): 98.7 (10-21-18 @ 11:34), Max: 98.7 (10-21-18 @ 11:34)  HR: 84 (10-21-18 @ 11:34) (74 - 85)  BP: 129/57 (10-21-18 @ 11:34) (113/50 - 148/81)  ABP: --  RR: 18 (10-21-18 @ 11:34) (18 - 18)  SpO2: 95% (10-21-18 @ 11:34) (94% - 98%)  Wt(kg): --  CVP(cm H2O): --  CO: --  PCWP: --    I and O's:    10-21 @ 07:01  -  10-21 @ 13:47  --------------------------------------------------------  IN:  Total IN: 0 mL    OUT:    Voided: 550 mL  Total OUT: 550 mL    Total NET: -550 mL        Daily Height in cm: 167.64 (20 Oct 2018 22:53)    Daily     PHYSICAL EXAM:  Constitutional: well developed, obese  and in nad  HEENT: PERRLA,  no icteric sclera and mild pallor of conjunctiva noted  Neck: No JVD, thyromegaly or adenopathy  Respiratory: reduced air entry lower lungs with no rales, wheezing or rhonchi  Cardiovascular: S1 and S2 normally heard  Gastrointestinal: soft, obese-nondistended, nontender and normal bowel sounds heard  Extremities: No peripheral edema or cyanosis  Neurological: A/O x 3, no focal deficits  Psychiatric: Normal mood, normal affect  : No flank tenderness  Skin: No rashes        LABS:                        11.6   7.1   )-----------( 178      ( 21 Oct 2018 05:40 )             33.3           10-21    137  |  96  |  19<H>  ----------------------------<  162<H>  2.7<LL>   |  31  |  1.19  10-21    133<L>  |  93<L>  |  22<H>  ----------------------------<  107<H>  2.3<LL>   |  32<H>  |  0.99  10-21    132<L>  |  90<L>  |  24<H>  ----------------------------<  108<H>  2.3<LL>   |  31  |  1.11  10-21    127<L>  |  81<L>  |  30<H>  ----------------------------<  138<H>  2.4<LL>   |  35<H>  |  1.35<H>    Ca    8.2<L>      21 Oct 2018 12:45  Ca    7.5<L>      21 Oct 2018 06:45  Ca    7.6<L>      21 Oct 2018 05:40  Ca    8.5      21 Oct 2018 00:09    TPro  7.6  /  Alb  3.6  /  TBili  0.7  /  DBili  x   /  AST  37  /  ALT  29  /  AlkPhos  68  10-21          URINE STUDIES:  Urinalysis Basic - ( 21 Oct 2018 00:09 )    Color: Yellow / Appearance: Clear / S.010 / pH: x  Gluc: x / Ketone: Negative  / Bili: Negative / Urobili: Negative   Blood: x / Protein: 15 / Nitrite: Negative   Leuk Esterase: Negative / RBC: 2-5 /HPF / WBC 0-2 /HPF   Sq Epi: x / Non Sq Epi: x / Bacteria: Trace /HPF      URINE LYTES:  Sodium, Random Urine: 40 mmol/L (10-21-18 @ 05:40)  Osmolality, Random Urine: 164 mos/kg (10-21-18 @ 05:40)  Potassium, Random Urine: 3 mmol/L (10-21-18 @ 05:40)  Creatinine, Random Urine: <13 mg/dL (10-21-18 @ 05:40)  Chloride, Random Urine: 31 mmol/L (10-21-18 @ 05:40)                RADIOLOGY & ADDITIONAL STUDIES:  < from: CT Abdomen and Pelvis w/ IV Cont (10.21.18 @ 03:55) >  EXAM:  CT ABDOMEN AND PELVIS IC                            PROCEDURE DATE:  10/21/2018          INTERPRETATION:  CLINICAL INFORMATION: Right lower quadrant abdominal   pain and tenderness. Rule out appendicitis/bowel obstruction.    COMPARISON: CT abdomen/pelvis of 2016.    PROCEDURE:   CT of the Abdomen and Pelvis was performed with intravenous contrast.   Intravenous contrast: 90 ml Omnipaque 350. 10 ml discarded.  Oral contrast: positive contrast was administered.  Sagittal and coronal reformats were performed.    FINDINGS:    LOWER CHEST: Scattered bibasilar subsegmental atelectasis. Aortic   valvular and mitral annular calcification.    LIVER: 1.1 cm hepatic cyst.  BILE DUCTS: CBD stent with associated mild pneumobilia.  GALLBLADDER: Status post cholecystectomy.  SPLEEN: Within normal limits.  PANCREAS: Within normal limits.  ADRENALS: Within normal limits.  KIDNEYS/URETERS: Bilateral renal cysts and low-attenuation lesions too   small to accurately characterize. No hydronephrosis. Symmetric   parenchymal enhancement.    BLADDER: Within normal limits.  REPRODUCTIVE ORGANS: The prostate is within normal limits in size and   demonstrates a few coarse calcifications.    BOWEL: No bowel obstruction. Sigmoid and scattered colonic diverticulosis   without diverticulitis.Normal appendix.  PERITONEUM: No ascites.  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  RETROPERITONEUM: No lymphadenopathy.    ABDOMINAL WALL: Within normal limits.  BONES: Multilevel degenerative changes of the spine. Severe right hip   < from: CT Abdomen and Pelvis w/ IV Cont (10.21.18 @ 03:55) >  joint arthrosis.    IMPRESSION:     No bowel obstruction. Normal appendix.    Chronic diverticulosis withoutdiverticulitis.    CBD stent in place.    Infrarenal abdominal aortic aneurysm measuring up to 3.8 cm, previously   3.4 cm on 2016.      < end of copied text >    < end of copied text >

## 2018-10-21 NOTE — H&P ADULT - PROBLEM SELECTOR PLAN 3
-GUILLERMINA on CKD stage   -Creat 1.3 on admission (baseline 1.1)  -Maybe 2/2 pre renal causes from dehydration and GI loss  -gentle hydration  -Monitor BMP  -f/u Urine lytes  -Renal US if creat does not improve

## 2018-10-21 NOTE — H&P ADULT - PROBLEM SELECTOR PLAN 2
-K of 2.7 on admission  -Likely from diuretic use   -Hold Hctz for now  -s/p K+ PO 40, and IV 10 x 3 in ED  -f/u AM potassium   -monitor BMP daily -K of 2.4 on admission  -Likely from diuretic use   -Hold Hctz for now  -s/p K+ PO 40, and IV 10 x 3 in ED  -f/u AM potassium   -monitor BMP daily  -Tele monitoring

## 2018-10-21 NOTE — H&P ADULT - PROBLEM SELECTOR PLAN 6
-Pain control w/ tylenol  -avoid nephrotoxic meds -CT abd sig for  Infrarenal AAA currently 3.8 cm, when compared to prior in 2016 which showed 3.4 cm.

## 2018-10-21 NOTE — CONSULT NOTE ADULT - PROBLEM SELECTOR RECOMMENDATION 9
- ct show severe right hip arthrosis  - no nsaids due to hyponatremia  - iv acetaminophen for 2 doses  - no iv opioids  - stool softeners- bm today - ct show severe right hip arthrosis  - no nsaids due to hyponatremia  - iv acetaminophen for 2 doses  - no iv opioids  - stool softeners- bm today  - lidocaine patch right hip

## 2018-10-22 DIAGNOSIS — M25.551 PAIN IN RIGHT HIP: ICD-10-CM

## 2018-10-22 LAB
ANION GAP SERPL CALC-SCNC: 7 MMOL/L — SIGNIFICANT CHANGE UP (ref 5–17)
ANION GAP SERPL CALC-SCNC: 8 MMOL/L — SIGNIFICANT CHANGE UP (ref 5–17)
ANION GAP SERPL CALC-SCNC: 8 MMOL/L — SIGNIFICANT CHANGE UP (ref 5–17)
BUN SERPL-MCNC: 15 MG/DL — SIGNIFICANT CHANGE UP (ref 7–18)
BUN SERPL-MCNC: 16 MG/DL — SIGNIFICANT CHANGE UP (ref 7–18)
BUN SERPL-MCNC: 17 MG/DL — SIGNIFICANT CHANGE UP (ref 7–18)
CALCIUM SERPL-MCNC: 8.2 MG/DL — LOW (ref 8.4–10.5)
CALCIUM SERPL-MCNC: 8.2 MG/DL — LOW (ref 8.4–10.5)
CALCIUM SERPL-MCNC: 8.6 MG/DL — SIGNIFICANT CHANGE UP (ref 8.4–10.5)
CHLORIDE SERPL-SCNC: 103 MMOL/L — SIGNIFICANT CHANGE UP (ref 96–108)
CHLORIDE SERPL-SCNC: 104 MMOL/L — SIGNIFICANT CHANGE UP (ref 96–108)
CHLORIDE SERPL-SCNC: 99 MMOL/L — SIGNIFICANT CHANGE UP (ref 96–108)
CO2 SERPL-SCNC: 27 MMOL/L — SIGNIFICANT CHANGE UP (ref 22–31)
CO2 SERPL-SCNC: 30 MMOL/L — SIGNIFICANT CHANGE UP (ref 22–31)
CO2 SERPL-SCNC: 30 MMOL/L — SIGNIFICANT CHANGE UP (ref 22–31)
CREAT SERPL-MCNC: 1 MG/DL — SIGNIFICANT CHANGE UP (ref 0.5–1.3)
CREAT SERPL-MCNC: 1.03 MG/DL — SIGNIFICANT CHANGE UP (ref 0.5–1.3)
CREAT SERPL-MCNC: 1.24 MG/DL — SIGNIFICANT CHANGE UP (ref 0.5–1.3)
GLUCOSE SERPL-MCNC: 105 MG/DL — HIGH (ref 70–99)
GLUCOSE SERPL-MCNC: 145 MG/DL — HIGH (ref 70–99)
GLUCOSE SERPL-MCNC: 176 MG/DL — HIGH (ref 70–99)
HCT VFR BLD CALC: 37.5 % — LOW (ref 39–50)
HGB BLD-MCNC: 12.6 G/DL — LOW (ref 13–17)
MAGNESIUM SERPL-MCNC: 2.6 MG/DL — SIGNIFICANT CHANGE UP (ref 1.6–2.6)
MCHC RBC-ENTMCNC: 30.6 PG — SIGNIFICANT CHANGE UP (ref 27–34)
MCHC RBC-ENTMCNC: 33.6 GM/DL — SIGNIFICANT CHANGE UP (ref 32–36)
MCV RBC AUTO: 91.3 FL — SIGNIFICANT CHANGE UP (ref 80–100)
PHOSPHATE SERPL-MCNC: 2.1 MG/DL — LOW (ref 2.5–4.5)
PHOSPHATE SERPL-MCNC: 2.5 MG/DL — SIGNIFICANT CHANGE UP (ref 2.5–4.5)
PLATELET # BLD AUTO: 177 K/UL — SIGNIFICANT CHANGE UP (ref 150–400)
POTASSIUM SERPL-MCNC: 3.4 MMOL/L — LOW (ref 3.5–5.3)
POTASSIUM SERPL-MCNC: 3.6 MMOL/L — SIGNIFICANT CHANGE UP (ref 3.5–5.3)
POTASSIUM SERPL-MCNC: 3.8 MMOL/L — SIGNIFICANT CHANGE UP (ref 3.5–5.3)
POTASSIUM SERPL-SCNC: 3.4 MMOL/L — LOW (ref 3.5–5.3)
POTASSIUM SERPL-SCNC: 3.6 MMOL/L — SIGNIFICANT CHANGE UP (ref 3.5–5.3)
POTASSIUM SERPL-SCNC: 3.8 MMOL/L — SIGNIFICANT CHANGE UP (ref 3.5–5.3)
RBC # BLD: 4.11 M/UL — LOW (ref 4.2–5.8)
RBC # FLD: 13 % — SIGNIFICANT CHANGE UP (ref 10.3–14.5)
SODIUM SERPL-SCNC: 137 MMOL/L — SIGNIFICANT CHANGE UP (ref 135–145)
SODIUM SERPL-SCNC: 139 MMOL/L — SIGNIFICANT CHANGE UP (ref 135–145)
SODIUM SERPL-SCNC: 140 MMOL/L — SIGNIFICANT CHANGE UP (ref 135–145)
WBC # BLD: 6.8 K/UL — SIGNIFICANT CHANGE UP (ref 3.8–10.5)
WBC # FLD AUTO: 6.8 K/UL — SIGNIFICANT CHANGE UP (ref 3.8–10.5)

## 2018-10-22 RX ORDER — ACETAMINOPHEN 500 MG
1000 TABLET ORAL ONCE
Qty: 0 | Refills: 0 | Status: COMPLETED | OUTPATIENT
Start: 2018-10-22 | End: 2018-10-22

## 2018-10-22 RX ORDER — SODIUM CHLORIDE 9 MG/ML
1000 INJECTION, SOLUTION INTRAVENOUS
Qty: 0 | Refills: 0 | Status: DISCONTINUED | OUTPATIENT
Start: 2018-10-22 | End: 2018-10-22

## 2018-10-22 RX ORDER — POTASSIUM CHLORIDE 20 MEQ
40 PACKET (EA) ORAL ONCE
Qty: 0 | Refills: 0 | Status: COMPLETED | OUTPATIENT
Start: 2018-10-22 | End: 2018-10-22

## 2018-10-22 RX ORDER — SODIUM CHLORIDE 9 MG/ML
100 INJECTION, SOLUTION INTRAVENOUS
Qty: 0 | Refills: 0 | Status: DISCONTINUED | OUTPATIENT
Start: 2018-10-22 | End: 2018-10-22

## 2018-10-22 RX ORDER — ACETAMINOPHEN 500 MG
650 TABLET ORAL EVERY 6 HOURS
Qty: 0 | Refills: 0 | Status: DISCONTINUED | OUTPATIENT
Start: 2018-10-23 | End: 2018-10-23

## 2018-10-22 RX ADMIN — Medication 400 MILLIGRAM(S): at 21:21

## 2018-10-22 RX ADMIN — Medication 400 MILLIGRAM(S): at 10:29

## 2018-10-22 RX ADMIN — Medication 1 DROP(S): at 21:21

## 2018-10-22 RX ADMIN — LIDOCAINE 1 PATCH: 4 CREAM TOPICAL at 21:21

## 2018-10-22 RX ADMIN — SENNA PLUS 2 TABLET(S): 8.6 TABLET ORAL at 21:20

## 2018-10-22 RX ADMIN — SODIUM CHLORIDE 100 MILLILITER(S): 9 INJECTION, SOLUTION INTRAVENOUS at 11:23

## 2018-10-22 RX ADMIN — LIDOCAINE 1 PATCH: 4 CREAM TOPICAL at 12:50

## 2018-10-22 RX ADMIN — HEPARIN SODIUM 5000 UNIT(S): 5000 INJECTION INTRAVENOUS; SUBCUTANEOUS at 21:20

## 2018-10-22 RX ADMIN — Medication 81 MILLIGRAM(S): at 12:50

## 2018-10-22 RX ADMIN — Medication 1000 MILLIGRAM(S): at 11:20

## 2018-10-22 RX ADMIN — PANTOPRAZOLE SODIUM 40 MILLIGRAM(S): 20 TABLET, DELAYED RELEASE ORAL at 05:50

## 2018-10-22 RX ADMIN — HEPARIN SODIUM 5000 UNIT(S): 5000 INJECTION INTRAVENOUS; SUBCUTANEOUS at 05:50

## 2018-10-22 RX ADMIN — SODIUM CHLORIDE 75 MILLILITER(S): 9 INJECTION, SOLUTION INTRAVENOUS at 01:19

## 2018-10-22 RX ADMIN — POLYETHYLENE GLYCOL 3350 17 GRAM(S): 17 POWDER, FOR SOLUTION ORAL at 12:50

## 2018-10-22 RX ADMIN — SODIUM CHLORIDE 50 MILLILITER(S): 9 INJECTION, SOLUTION INTRAVENOUS at 10:29

## 2018-10-22 RX ADMIN — TAMSULOSIN HYDROCHLORIDE 0.4 MILLIGRAM(S): 0.4 CAPSULE ORAL at 21:20

## 2018-10-22 RX ADMIN — Medication 100 MILLIGRAM(S): at 15:19

## 2018-10-22 RX ADMIN — Medication 400 MILLIGRAM(S): at 15:23

## 2018-10-22 RX ADMIN — ATORVASTATIN CALCIUM 10 MILLIGRAM(S): 80 TABLET, FILM COATED ORAL at 21:20

## 2018-10-22 RX ADMIN — Medication 40 MILLIEQUIVALENT(S): at 10:29

## 2018-10-22 RX ADMIN — AMLODIPINE BESYLATE 5 MILLIGRAM(S): 2.5 TABLET ORAL at 05:50

## 2018-10-22 RX ADMIN — Medication 1000 MILLIGRAM(S): at 05:43

## 2018-10-22 RX ADMIN — Medication 1000 MILLIGRAM(S): at 23:58

## 2018-10-22 RX ADMIN — FINASTERIDE 5 MILLIGRAM(S): 5 TABLET, FILM COATED ORAL at 12:49

## 2018-10-22 RX ADMIN — Medication 100 MILLIGRAM(S): at 05:50

## 2018-10-22 RX ADMIN — HEPARIN SODIUM 5000 UNIT(S): 5000 INJECTION INTRAVENOUS; SUBCUTANEOUS at 15:18

## 2018-10-22 RX ADMIN — LIDOCAINE 1 PATCH: 4 CREAM TOPICAL at 05:49

## 2018-10-22 RX ADMIN — Medication 100 MILLIGRAM(S): at 21:20

## 2018-10-22 NOTE — PROGRESS NOTE ADULT - SUBJECTIVE AND OBJECTIVE BOX
pt seen and examined.pts current chart reviewed and case discussed with resident covering.    SUBJECTIVE:  pt feels fine and denies cp,sob,gi or gu/uremic  symptons    REVIEW OF SYSTEMS:  CONSTITUTIONAL: No weakness, fevers or chills  EYES/ENT: No visual changes;  No vertigo or throat pain   NECK: No pain or stiffness  RESPIRATORY: No cough, wheezing, hemoptysis; No shortness of breath  CARDIOVASCULAR: No chest pain or palpitations  GASTROINTESTINAL: No abdominal or epigastric pain. No nausea, vomiting, or hematemesis; No diarrhea or constipation. No melena or hematochezia.  GENITOURINARY: No dysuria, frequency , hematuria, flank pain or nocturia  NEUROLOGICAL: No numbness or weakness  SKIN: No itching, burning, rashes, or lesions   All other review of systems is negative unless indicated above    Current meds:    acetaminophen  IVPB .. 1000 milliGRAM(s) IV Intermittent once  acetaminophen  IVPB .. 1000 milliGRAM(s) IV Intermittent once  amLODIPine   Tablet 5 milliGRAM(s) Oral daily  aspirin  chewable 81 milliGRAM(s) Oral daily  atorvastatin 10 milliGRAM(s) Oral at bedtime  dextrose 5%. 1000 milliLiter(s) IV Continuous <Continuous>  docusate sodium 100 milliGRAM(s) Oral three times a day  finasteride 5 milliGRAM(s) Oral daily  heparin  Injectable 5000 Unit(s) SubCutaneous every 8 hours  influenza   Vaccine 0.5 milliLiter(s) IntraMuscular once  lidocaine   Patch 1 Patch Transdermal daily  pantoprazole    Tablet 40 milliGRAM(s) Oral before breakfast  polyethylene glycol 3350 17 Gram(s) Oral daily  senna 2 Tablet(s) Oral at bedtime  tamsulosin 0.4 milliGRAM(s) Oral at bedtime      Vital Signs    T(F): 98.3 (10-22-18 @ 12:04), Max: 98.4 (10-21-18 @ 15:57)  HR: 77 (10-22-18 @ 12:04) (68 - 77)  BP: 105/66 (10-22-18 @ 12:04) (105/66 - 150/55)  ABP: --  RR: 18 (10-22-18 @ 12:04) (17 - 18)  SpO2: 95% (10-22-18 @ 12:04) (93% - 98%)  Wt(kg): --  CVP(cm H2O): --  CO: --  PCWP: --    I and O's:    10-21 @ 07:01  -  10-22 @ 07:00  --------------------------------------------------------  IN:  Total IN: 0 mL    OUT:    Stool: 1 mL    Voided: 1050 mL  Total OUT: 1051 mL    Total NET: -1051 mL        Daily     Daily     PHYSICAL EXAM:  Constitutional: well developed, well nourished  and in nad  HEENT: PERRLA,  no icteric sclera and mild pallor of conjunctiva noted  Neck: No JVD, thyromegaly or adenopathy  Respiratory: reduced air entry lower lungs with no rales, wheezing or rhonchi  Cardiovascular: S1 and S2 normally heard  Gastrointestinal: soft, nondistended, nontender and normal bowel sounds heard  Extremities: No peripheral edema or cyanosis  Neurological: A/O x 3, no focal deficits  : No flank or cva tenderness palpated.  Skin: No rashes      LABS:    CBC:                          12.6   6.8   )-----------( 177      ( 22 Oct 2018 07:01 )             37.5           BMP:    10-22    140  |  103  |  15  ----------------------------<  105<H>  3.4<L>   |  30  |  1.00  10-21    137  |  99  |  17  ----------------------------<  176<H>  3.6   |  30  |  1.03  10-21    137  |  97  |  19<H>  ----------------------------<  181<H>  3.1<L>   |  33<H>  |  1.05  10-21    137  |  96  |  19<H>  ----------------------------<  162<H>  2.7<LL>   |  31  |  1.19  10-21    133<L>  |  93<L>  |  22<H>  ----------------------------<  107<H>  2.3<LL>   |  32<H>  |  0.99  10-21    132<L>  |  90<L>  |  24<H>  ----------------------------<  108<H>  2.3<LL>   |  31  |  1.11  10-21    127<L>  |  81<L>  |  30<H>  ----------------------------<  138<H>  2.4<LL>   |  35<H>  |  1.35<H>    Ca    8.2<L>      22 Oct 2018 07:01  Ca    8.2<L>      21 Oct 2018 23:57  Ca    8.1<L>      21 Oct 2018 18:56  Ca    8.2<L>      21 Oct 2018 12:45  Ca    7.5<L>      21 Oct 2018 06:45  Ca    7.6<L>      21 Oct 2018 05:40  Ca    8.5      21 Oct 2018 00:09  Phos  2.1     10-22  Phos  2.5     10-21  Mg     2.6     10-22  Mg     2.5     10-21    TPro  7.6  /  Alb  3.6  /  TBili  0.7  /  DBili  x   /  AST  37  /  ALT  29  /  AlkPhos  68  10-21      Thyroid Stimulating Hormone, Serum: 1.24 uU/mL (10-21 @ 05:40)      URINE STUDIES:        Sodium, Random Urine: 40 mmol/L (10-21 @ 05:40)  Osmolality, Random Urine: 164 mos/kg (10-21 @ 05:40)  Potassium, Random Urine: 3 mmol/L (10-21 @ 05:40)  Creatinine, Random Urine: <13 mg/dL (10-21 @ 05:40)  Chloride, Random Urine: 31 mmol/L (10-21 @ 05:40)                  RADIOLOGY & ADDITIONAL STUDIES: pt seen and examined.    SUBJECTIVE:  pt  denies cp,sob,gi or gu  symptons  pt still with mild Rt Hip pain, seen by pain specilist  REVIEW OF SYSTEMS:  CONSTITUTIONAL: No weakness, fevers or chills  EYES/ENT: No visual changes;  No vertigo or throat pain   NECK: No pain or stiffness  RESPIRATORY: No cough, wheezing, hemoptysis; No shortness of breath  CARDIOVASCULAR: No chest pain or palpitations  GASTROINTESTINAL: No abdominal or epigastric pain. No nausea, vomiting, or hematemesis; No diarrhea or constipation. No melena or hematochezia.  GENITOURINARY: No dysuria, frequency , hematuria, flank pain or nocturia  NEUROLOGICAL: No numbness or weakness  SKIN: No itching, burning, rashes, or lesions   All other review of systems is negative unless indicated above    Current meds:    acetaminophen  IVPB .. 1000 milliGRAM(s) IV Intermittent once  acetaminophen  IVPB .. 1000 milliGRAM(s) IV Intermittent once  amLODIPine   Tablet 5 milliGRAM(s) Oral daily  aspirin  chewable 81 milliGRAM(s) Oral daily  atorvastatin 10 milliGRAM(s) Oral at bedtime  dextrose 5%. 1000 milliLiter(s) IV Continuous <Continuous>  docusate sodium 100 milliGRAM(s) Oral three times a day  finasteride 5 milliGRAM(s) Oral daily  heparin  Injectable 5000 Unit(s) SubCutaneous every 8 hours  influenza   Vaccine 0.5 milliLiter(s) IntraMuscular once  lidocaine   Patch 1 Patch Transdermal daily  pantoprazole    Tablet 40 milliGRAM(s) Oral before breakfast  polyethylene glycol 3350 17 Gram(s) Oral daily  senna 2 Tablet(s) Oral at bedtime  tamsulosin 0.4 milliGRAM(s) Oral at bedtime      Vital Signs    T(F): 98.3 (10-22-18 @ 12:04), Max: 98.4 (10-21-18 @ 15:57)  HR: 77 (10-22-18 @ 12:04) (68 - 77)  BP: 105/66 (10-22-18 @ 12:04) (105/66 - 150/55)  ABP: --  RR: 18 (10-22-18 @ 12:04) (17 - 18)  SpO2: 95% (10-22-18 @ 12:04) (93% - 98%)  Wt(kg): --  CVP(cm H2O): --  CO: --  PCWP: --    I and O's:    10-21 @ 07:01  -  10-22 @ 07:00  --------------------------------------------------------  IN:  Total IN: 0 mL    OUT:    Stool: 1 mL    Voided: 1050 mL  Total OUT: 1051 mL    Total NET: -1051 mL        Daily     Daily     PHYSICAL EXAM:  Constitutional: well developed, obese  and in nad  HEENT: PERRLA,  no icteric sclera and mild pallor of conjunctiva noted  Neck: No JVD, thyromegaly or adenopathy  Respiratory: reduced air entry lower lungs with no rales, wheezing or rhonchi  Cardiovascular: S1 and S2 normally heard  Gastrointestinal: soft, obese-nondistended, nontender and normal bowel sounds heard  Extremities: No peripheral edema or cyanosis  Neurological: A/O x 3, no focal deficits  Skin: No rashes      LABS:    CBC:                          12.6   6.8   )-----------( 177      ( 22 Oct 2018 07:01 )             37.5           BMP:    10-22    140  |  103  |  15  ----------------------------<  105<H>  3.4<L>   |  30  |  1.00  10-21    137  |  99  |  17  ----------------------------<  176<H>  3.6   |  30  |  1.03  10-21    137  |  97  |  19<H>  ----------------------------<  181<H>  3.1<L>   |  33<H>  |  1.05  10-21    137  |  96  |  19<H>  ----------------------------<  162<H>  2.7<LL>   |  31  |  1.19  10-21    133<L>  |  93<L>  |  22<H>  ----------------------------<  107<H>  2.3<LL>   |  32<H>  |  0.99  10-21    132<L>  |  90<L>  |  24<H>  ----------------------------<  108<H>  2.3<LL>   |  31  |  1.11  10-21    127<L>  |  81<L>  |  30<H>  ----------------------------<  138<H>  2.4<LL>   |  35<H>  |  1.35<H>    Ca    8.2<L>      22 Oct 2018 07:01  Ca    8.2<L>      21 Oct 2018 23:57  Ca    8.1<L>      21 Oct 2018 18:56  Ca    8.2<L>      21 Oct 2018 12:45  Ca    7.5<L>      21 Oct 2018 06:45  Ca    7.6<L>      21 Oct 2018 05:40  Ca    8.5      21 Oct 2018 00:09  Phos  2.1     10-22  Phos  2.5     10-21  Mg     2.6     10-22  Mg     2.5     10-21    TPro  7.6  /  Alb  3.6  /  TBili  0.7  /  DBili  x   /  AST  37  /  ALT  29  /  AlkPhos  68  10-21      Thyroid Stimulating Hormone, Serum: 1.24 uU/mL (10-21 @ 05:40)      URINE STUDIES:  Sodium, Random Urine: 40 mmol/L (10-21 @ 05:40)  Osmolality, Random Urine: 164 mos/kg (10-21 @ 05:40)  Potassium, Random Urine: 3 mmol/L (10-21 @ 05:40)  Creatinine, Random Urine: <13 mg/dL (10-21 @ 05:40)  Chloride, Random Urine: 31 mmol/L (10-21 @ 05:40)

## 2018-10-22 NOTE — PROGRESS NOTE ADULT - SUBJECTIVE AND OBJECTIVE BOX
AALIYAH KELLY  MRN-[id number]    Patient is a 89y old  Male who presents with a chief complaint of abd pain (21 Oct 2018 13:45)      patient seen and examined    s doing ok , no abd pain     MEDICATIONS  (STANDING):  acetaminophen  IVPB .. 1000 milliGRAM(s) IV Intermittent once  amLODIPine   Tablet 5 milliGRAM(s) Oral daily  aspirin  chewable 81 milliGRAM(s) Oral daily  atorvastatin 10 milliGRAM(s) Oral at bedtime  dextrose 5% + sodium chloride 0.9%. 100 milliLiter(s) (50 mL/Hr) IV Continuous <Continuous>  dextrose 5%. 1000 milliLiter(s) (75 mL/Hr) IV Continuous <Continuous>  docusate sodium 100 milliGRAM(s) Oral three times a day  finasteride 5 milliGRAM(s) Oral daily  heparin  Injectable 5000 Unit(s) SubCutaneous every 8 hours  influenza   Vaccine 0.5 milliLiter(s) IntraMuscular once  lidocaine   Patch 1 Patch Transdermal daily  pantoprazole    Tablet 40 milliGRAM(s) Oral before breakfast  polyethylene glycol 3350 17 Gram(s) Oral daily  potassium chloride    Tablet ER 40 milliEquivalent(s) Oral once  senna 2 Tablet(s) Oral at bedtime  tamsulosin 0.4 milliGRAM(s) Oral at bedtime      MEDICATIONS  (PRN):      Allergies    No Known Allergies    Intolerances        PAST MEDICAL & SURGICAL HISTORY:  Hemorrhoids  Arthritis  HTN (hypertension)  BPH (benign prostatic hyperplasia)  No significant past surgical history      FAMILY HISTORY:  Family history of heart disease (Father)      SOCIAL HISTORY  Smoking History:     REVIEW OF SYSTEMS:    CONSTITUTIONAL:  Fevers [  ]  Yes  [ x ]  No                                   chills [  ]  Yes  [x  ]  No                               sweats  [  ]  Yes  [ x ]  No                                fatigue [ x ]  Yes  [  ]  No    HEENT:  Eyes:  Diplopia or blurred vision [  ]  Yes  [ x ]  No    ENT:                        earache  [  ]  Yes  [x  ]  No                             sore throat  [  ]  Yes  [x  ]  No                            runny nose.  [  ]  Yes  [ x ]  No    CARDIOVASCULAR:       chest pain  [  ]  Yes  [x  ]  No                        squeezing, tightness,  [  ]  Yes  [ x ]  No                                      palpitations.  [  ]  Yes  [x  ]  No    RESPIRATORY:             Cough [  ]  Yes  [x  ]  No                                  Wheezing [  ]  Yes  [x  ]  No                                Hemoptysis [  ]  Yes  [  x]  No                                      Sputum [  ]  Yes  [ x ]  No                   Shortness of Breathe [  ]  Yes  [ x ]  No    GASTROINTESTINAL:      Abdominal pain  [  ]  Yes  [x  ]  No                                                    Nausea  [  ]  Yes  [x  ]  No                                                   Vomiting [  ]  Yes  [x  ]  No                                              Constipation [ x ]  Yes  [  ]  No                                                    Diarrhea [  ]  Yes  [x  ]  No    GENITOURINARY:           Incontinence x[  ]  Yes  [  ]  No                                                Dysuria [  ]  Yes  [ x ]  No                                      Blood in Urine  [  ]  Yes  [x  ]  No                          Frequency or urgency.  [  ]  Yes  [ x ]  No    NEUROLOGIC:                     Paresthesias  [  ]  Yes  [x  ]  No                                             fasciculations  [  ]  Yes  [x  ]  No                                seizures or weakness.  [  ]  Yes  [  x]  No                                                   Headache [  ]  Yes  [ x ]  No                                  Loss of Conciousness [  ]  Yes  [xx  ]  No                                                     Insomnia [  ]  Yes  [ x ]  No    PSYCHIATRIC:    Disorder of thought or mood.  [x  ]  Yes  [  ]  No                                                           Anxiety [  ]  Yes  [  ]  No                                                      Depression [  ]  Yes  [  ]  No                                                         Dementia [x  ]  Yes  [  ]  No axox2    Vital Signs Last 24 Hrs  T(C): 36.4 (22 Oct 2018 07:51), Max: 37.1 (21 Oct 2018 11:34)  T(F): 97.6 (22 Oct 2018 07:51), Max: 98.7 (21 Oct 2018 11:34)  HR: 73 (22 Oct 2018 07:51) (68 - 84)  BP: 150/55 (22 Oct 2018 07:51) (110/59 - 150/55)  BP(mean): --  RR: 18 (22 Oct 2018 07:51) (17 - 18)  SpO2: 96% (22 Oct 2018 07:51) (93% - 98%)  I&O's Detail    21 Oct 2018 07:01  -  22 Oct 2018 07:00  --------------------------------------------------------  IN:  Total IN: 0 mL    OUT:    Stool: 1 mL    Voided: 1050 mL  Total OUT: 1051 mL    Total NET: -1051 mL          PHYSICAL EXAMINATION:    GENERAL: The patient is a well-developed, well-nourished _____in no apparent distress.     HEENT: Head is normocephalic and atraumatic. Extraocular muscles are intact. Mucous membranes are moist.     NECK: Supple. jvd [  ]  Yes  [x  ]  No    LUNGS: Clear to auscultation  [x  ]  Yes  [  ]  No                               wheezing, [  ]  Yes  [x  ]  No                                      rales  [  ]  Yes  [x  ]  No                                    rhonchi  [  ]  Yes  [x  ]  No                 Respirations labored  [  ]  Yes  x[  ]  No    HEART: Regular rate and rhythm  [  ]  Yes  [  x]  No                                        Murmur [  ]  Yes  [x  ]  No                                              rub  [  ]  Yes  [x  ]  No                                          gallop  [  ]  Yes  [x  ]  No    ABDOMEN:                                 Soft, [x  ]  Yes  [  ]  No                                             nontender [  ]  Yes  [ x ]  No                                             distended.[  ]  Yes  [x  ]  No                            hepatosplenomegaly ,[  ]  Yes  [x  ]  No                                                    BS   [ x ]  Yes  [  ]  No    EXTREMITIES:                cyanosis, [  ]  Yes  [x  ]  No                                       clubbing,   [  ]  Yes  [ x ]  No                                               rash, [  ]  Yes  [ x ]  No                                           edema.  [  ]  Yes  [  x]  No    NEUROLOGIC: Alert and Oriented  [ x ] Person [  ] Time  [x ] Place                                    Cranial nerves intact    [  ]  Yes  [  ]  No                                               sensory Intact  [  ]  Yes  [  ]  No                                                  motor WNL   [  ]  Yes  [  ]  No                                                Chandrakant Paresis  [  ]  Yes  [ x ]  No  DTR  babinski+ or -      LABS:                        12.6   6.8   )-----------( 177      ( 22 Oct 2018 07:01 )             37.5     10-    140  |  103  |  15  ----------------------------<  105<H>  3.4<L>   |  30  |  1.00    Ca    8.2<L>      22 Oct 2018 07:01  Phos  2.1     10-  Mg     2.6     10    TPro  7.6  /  Alb  3.6  /  TBili  0.7  /  DBili  x   /  AST  37  /  ALT  29  /  AlkPhos  68  10-21      Urinalysis Basic - ( 21 Oct 2018 00:09 )    Color: Yellow / Appearance: Clear / S.010 / pH: x  Gluc: x / Ketone: Negative  / Bili: Negative / Urobili: Negative   Blood: x / Protein: 15 / Nitrite: Negative   Leuk Esterase: Negative / RBC: 2-5 /HPF / WBC 0-2 /HPF   Sq Epi: x / Non Sq Epi: x / Bacteria: Trace /HPF                      MICROBIOLOGY:    RADIOLOGY & ADDITIONAL STUDIES:  < from: CT Abdomen and Pelvis w/ IV Cont (10.21.18 @ 03:55) >  IMPRESSION:     No bowel obstruction. Normal appendix.    Chronic diverticulosis withoutdiverticulitis.    CBD stent in place.    Infrarenal abdominal aortic aneurysm measuring up to 3.8 cm, previously   3.4 cm on 2016.      < end of copied text >    CXR:    Ct scan chest:    ekg;    echo:

## 2018-10-22 NOTE — PROGRESS NOTE ADULT - SUBJECTIVE AND OBJECTIVE BOX
PGY 1 Note discussed with supervising resident and primary attending    Patient is a 89y old  Male who presents with a chief complaint of abd pain (22 Oct 2018 13:40)      INTERVAL HPI/OVERNIGHT EVENTS: no events noted overnight.    MEDICATIONS  (STANDING):  acetaminophen  IVPB .. 1000 milliGRAM(s) IV Intermittent once  amLODIPine   Tablet 5 milliGRAM(s) Oral daily  aspirin  chewable 81 milliGRAM(s) Oral daily  atorvastatin 10 milliGRAM(s) Oral at bedtime  dextrose 5%. 1000 milliLiter(s) (100 mL/Hr) IV Continuous <Continuous>  docusate sodium 100 milliGRAM(s) Oral three times a day  finasteride 5 milliGRAM(s) Oral daily  heparin  Injectable 5000 Unit(s) SubCutaneous every 8 hours  influenza   Vaccine 0.5 milliLiter(s) IntraMuscular once  lidocaine   Patch 1 Patch Transdermal daily  pantoprazole    Tablet 40 milliGRAM(s) Oral before breakfast  polyethylene glycol 3350 17 Gram(s) Oral daily  senna 2 Tablet(s) Oral at bedtime  tamsulosin 0.4 milliGRAM(s) Oral at bedtime    MEDICATIONS  (PRN):      __________________________________________________  REVIEW OF SYSTEMS:  Patient denies cough, sputum production, fevers/chills/nausea/vomiting. No diarrhea, abdominal pain.      Vital Signs Last 24 Hrs  T(C): 36.8 (22 Oct 2018 12:04), Max: 36.9 (21 Oct 2018 15:57)  T(F): 98.3 (22 Oct 2018 12:04), Max: 98.4 (21 Oct 2018 15:57)  HR: 77 (22 Oct 2018 12:04) (68 - 77)  BP: 105/66 (22 Oct 2018 12:04) (105/66 - 150/55)  BP(mean): --  RR: 18 (22 Oct 2018 12:04) (17 - 18)  SpO2: 95% (22 Oct 2018 12:04) (93% - 98%)    ________________________________________________  PHYSICAL EXAM:  GENERAL: NAD  HEENT: Normocephalic;  conjunctivae and sclerae clear; moist mucous membranes;   NECK : supple  CHEST/LUNG: Clear to auscultation bilaterally with good air entry   HEART: S1 S2  regular; no murmurs, gallops or rubs  ABDOMEN: Soft, Nontender, Nondistended; Bowel sounds present  EXTREMITIES: no cyanosis; no edema; no calf tenderness  SKIN: warm and dry; no rash  NERVOUS SYSTEM:  Awake and alert; Oriented  to place, person and time ; no new deficits    _________________________________________________  LABS:                        12.6   6.8   )-----------( 177      ( 22 Oct 2018 07:01 )             37.5     10-    140  |  103  |  15  ----------------------------<  105<H>  3.4<L>   |  30  |  1.00    Ca    8.2<L>      22 Oct 2018 07:01  Phos  2.1     10-22  Mg     2.6     10-22    TPro  7.6  /  Alb  3.6  /  TBili  0.7  /  DBili  x   /  AST  37  /  ALT  29  /  AlkPhos  68  10-      Urinalysis Basic - ( 21 Oct 2018 00:09 )    Color: Yellow / Appearance: Clear / S.010 / pH: x  Gluc: x / Ketone: Negative  / Bili: Negative / Urobili: Negative   Blood: x / Protein: 15 / Nitrite: Negative   Leuk Esterase: Negative / RBC: 2-5 /HPF / WBC 0-2 /HPF   Sq Epi: x / Non Sq Epi: x / Bacteria: Trace /HPF      CAPILLARY BLOOD GLUCOSE      POCT Blood Glucose.: 181 mg/dL (21 Oct 2018 21:30)

## 2018-10-22 NOTE — PROGRESS NOTE ADULT - SUBJECTIVE AND OBJECTIVE BOX
NP Note discussed with  primary attending    Patient is a 89y old  Male who presents with a chief complaint of abd pain (22 Oct 2018 10:05)  Pt has hx of chronic hip pain, back  pain, arthritis. CT pelvis: +multilevel degenerative changes of the spine and severe right hip   joint arthrosis. Pt seen, sitting in chair, appears comfortable, NAD. Pt reports right hip pain is much better, medication he got this morning works well.   Pt denies numbness, weakness to LE. Reports no abdominal pain today.   Sodium,  potassium and creatinine  level improved   [place] phone  use [phone]    INTERVAL HPI/OVERNIGHT EVENTS: no new complaints    MEDICATIONS  (STANDING):  acetaminophen  IVPB .. 1000 milliGRAM(s) IV Intermittent once  acetaminophen  IVPB .. 1000 milliGRAM(s) IV Intermittent once  amLODIPine   Tablet 5 milliGRAM(s) Oral daily  aspirin  chewable 81 milliGRAM(s) Oral daily  atorvastatin 10 milliGRAM(s) Oral at bedtime  dextrose 5%. 1000 milliLiter(s) (100 mL/Hr) IV Continuous <Continuous>  docusate sodium 100 milliGRAM(s) Oral three times a day  finasteride 5 milliGRAM(s) Oral daily  heparin  Injectable 5000 Unit(s) SubCutaneous every 8 hours  influenza   Vaccine 0.5 milliLiter(s) IntraMuscular once  lidocaine   Patch 1 Patch Transdermal daily  pantoprazole    Tablet 40 milliGRAM(s) Oral before breakfast  polyethylene glycol 3350 17 Gram(s) Oral daily  senna 2 Tablet(s) Oral at bedtime  tamsulosin 0.4 milliGRAM(s) Oral at bedtime    MEDICATIONS  (PRN):      __________________________________________________  REVIEW OF SYSTEMS:    CONSTITUTIONAL: No fever,   RESPIRATORY: No cough; No shortness of breath  CARDIOVASCULAR: No chest pain, no palpitations  GASTROINTESTINAL: No pain. No nausea or vomiting; No diarrhea   NEUROLOGICAL: No headache or numbness, no tremors  MUSCULOSKELETAL: + right hip pain, back pain   GENITOURINARY: no dysuria, no frequency, no hesitancy  PSYCHIATRY: no depression , no anxiety  ALL OTHER  ROS negative        Vital Signs Last 24 Hrs  T(C): 36.8 (22 Oct 2018 12:04), Max: 36.9 (21 Oct 2018 15:57)  T(F): 98.3 (22 Oct 2018 12:04), Max: 98.4 (21 Oct 2018 15:57)  HR: 77 (22 Oct 2018 12:04) (68 - 77)  BP: 105/66 (22 Oct 2018 12:04) (105/66 - 150/55)  BP(mean): --  RR: 18 (22 Oct 2018 12:04) (17 - 18)  SpO2: 95% (22 Oct 2018 12:04) (93% - 98%)    ________________________________________________  PHYSICAL EXAM:  GENERAL: NAD  CHEST/LUNG: Clear to auscultation  HEART: S1 S2  regular; no murmurs,   ABDOMEN: Soft, Nontender, Nondistended; Bowel sounds present  MSK: + minimal ttp over right hip   EXTREMITIES: no cyanosis; no edema; no calf tenderness  SKIN: warm and dry; no rash  NERVOUS SYSTEM:  Awake and alert; Oriented  to place, person and time ; no new deficits    _________________________________________________  LABS:                        12.6   6.8   )-----------( 177      ( 22 Oct 2018 07:01 )             37.5     10-22    140  |  103  |  15  ----------------------------<  105<H>  3.4<L>   |  30  |  1.00    Ca    8.2<L>      22 Oct 2018 07:01  Phos  2.1     10-  Mg     2.6     10-    TPro  7.6  /  Alb  3.6  /  TBili  0.7  /  DBili  x   /  AST  37  /  ALT  29  /  AlkPhos  68  10-21      Urinalysis Basic - ( 21 Oct 2018 00:09 )    Color: Yellow / Appearance: Clear / S.010 / pH: x  Gluc: x / Ketone: Negative  / Bili: Negative / Urobili: Negative   Blood: x / Protein: 15 / Nitrite: Negative   Leuk Esterase: Negative / RBC: 2-5 /HPF / WBC 0-2 /HPF   Sq Epi: x / Non Sq Epi: x / Bacteria: Trace /HPF      CAPILLARY BLOOD GLUCOSE      POCT Blood Glucose.: 181 mg/dL (21 Oct 2018 21:30)        RADIOLOGY & ADDITIONAL TESTS:    Imaging Personally Reviewed:  YES/NO    Consultant(s) Notes Reviewed:   YES/ No    Care Discussed with Consultants :     Plan of care was discussed with patient and /or primary care giver; all questions and concerns were addressed and care was aligned with patient's wishes.

## 2018-10-22 NOTE — PROGRESS NOTE ADULT - ASSESSMENT
A/P:  1.HYPONATREMIA: r/o secondary to high adh state secondary to chronic Rt HIP/back pain plus excess po liquid intake plus Hctz induced , now Na is normal but Na has been corrected too fast  -suggest to lower Na level next 4 to 6 hrs and keep Na around 133 to 134 by midnight  -add d5w at 250 ml per hr x 4 hrs only  -F/u Na level in 4 hrs  -please do not correct Na level >8 meq in 24 hrs  2.HYPOKALEMIA: r/o secondary to Hctz  -suggest to replace po and iv kcl prn  -keep k >4 and <5  -f/u k level daily  3.Alkalosis:Mild, r/o secondary to caco3  -avoid caco3  -f/u co2 level daily  4.HTN: bp is acceptble  -continue tthe current bp meds  -keep bp<140/80  5.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 A/P:  1.HYPONATREMIA: r/o secondary to high adh state secondary to chronic Rt HIP/back pain plus excess po liquid intake plus Hctz induced , now Na is normal but Na has been corrected too fast  -suggest to lower Na level  and keep Na below  143 by midnight  -add d5w at 100 ml per hr x 6 hrs and readjust as per Na level   -please do not correct Na level >8 meq in 24 hrs  -f/u Na level q 6 hrs x 24 hrs   2.HYPOKALEMIA: r/o secondary to Hctz  -suggest to replace po and iv kcl prn  -keep k >4 and <5  -f/u k level daily  3.Alkalosis:Mild, r/o secondary to caco3, now stable  -avoid caco3  -f/u co2 level daily  4.HTN: bp is acceptble  -continue the current bp meds  -keep bp<140/80  5.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

## 2018-10-23 ENCOUNTER — TRANSCRIPTION ENCOUNTER (OUTPATIENT)
Age: 83
End: 2018-10-23

## 2018-10-23 VITALS
DIASTOLIC BLOOD PRESSURE: 68 MMHG | SYSTOLIC BLOOD PRESSURE: 152 MMHG | OXYGEN SATURATION: 97 % | RESPIRATION RATE: 18 BRPM | HEART RATE: 81 BPM

## 2018-10-23 LAB
ANION GAP SERPL CALC-SCNC: 5 MMOL/L — SIGNIFICANT CHANGE UP (ref 5–17)
BUN SERPL-MCNC: 14 MG/DL — SIGNIFICANT CHANGE UP (ref 7–18)
CALCIUM SERPL-MCNC: 8.6 MG/DL — SIGNIFICANT CHANGE UP (ref 8.4–10.5)
CHLORIDE SERPL-SCNC: 101 MMOL/L — SIGNIFICANT CHANGE UP (ref 96–108)
CO2 SERPL-SCNC: 32 MMOL/L — HIGH (ref 22–31)
CREAT SERPL-MCNC: 1.04 MG/DL — SIGNIFICANT CHANGE UP (ref 0.5–1.3)
GLUCOSE SERPL-MCNC: 97 MG/DL — SIGNIFICANT CHANGE UP (ref 70–99)
HCT VFR BLD CALC: 38.6 % — LOW (ref 39–50)
HGB BLD-MCNC: 13.1 G/DL — SIGNIFICANT CHANGE UP (ref 13–17)
MAGNESIUM SERPL-MCNC: 2.5 MG/DL — SIGNIFICANT CHANGE UP (ref 1.6–2.6)
MCHC RBC-ENTMCNC: 31.3 PG — SIGNIFICANT CHANGE UP (ref 27–34)
MCHC RBC-ENTMCNC: 33.9 GM/DL — SIGNIFICANT CHANGE UP (ref 32–36)
MCV RBC AUTO: 92.3 FL — SIGNIFICANT CHANGE UP (ref 80–100)
PHOSPHATE SERPL-MCNC: 2.8 MG/DL — SIGNIFICANT CHANGE UP (ref 2.5–4.5)
PLATELET # BLD AUTO: 191 K/UL — SIGNIFICANT CHANGE UP (ref 150–400)
POTASSIUM SERPL-MCNC: 3.5 MMOL/L — SIGNIFICANT CHANGE UP (ref 3.5–5.3)
POTASSIUM SERPL-SCNC: 3.5 MMOL/L — SIGNIFICANT CHANGE UP (ref 3.5–5.3)
RBC # BLD: 4.18 M/UL — LOW (ref 4.2–5.8)
RBC # FLD: 13.1 % — SIGNIFICANT CHANGE UP (ref 10.3–14.5)
SODIUM SERPL-SCNC: 138 MMOL/L — SIGNIFICANT CHANGE UP (ref 135–145)
WBC # BLD: 7.6 K/UL — SIGNIFICANT CHANGE UP (ref 3.8–10.5)
WBC # FLD AUTO: 7.6 K/UL — SIGNIFICANT CHANGE UP (ref 3.8–10.5)

## 2018-10-23 RX ORDER — AMLODIPINE BESYLATE 2.5 MG/1
1 TABLET ORAL
Qty: 0 | Refills: 0 | COMMUNITY

## 2018-10-23 RX ORDER — SENNA PLUS 8.6 MG/1
2 TABLET ORAL
Qty: 0 | Refills: 0 | COMMUNITY

## 2018-10-23 RX ORDER — AMLODIPINE BESYLATE 2.5 MG/1
1 TABLET ORAL
Qty: 30 | Refills: 0 | OUTPATIENT
Start: 2018-10-23 | End: 2018-11-21

## 2018-10-23 RX ORDER — DOCUSATE SODIUM 100 MG
1 CAPSULE ORAL
Qty: 0 | Refills: 0 | COMMUNITY

## 2018-10-23 RX ORDER — HYDROCORTISONE/PRAMOXINE 2.5 %-1 %
1 CREAM WITH APPLICATOR RECTAL
Qty: 30 | Refills: 0 | OUTPATIENT
Start: 2018-10-23 | End: 2018-11-21

## 2018-10-23 RX ORDER — LORATADINE 10 MG/1
1 TABLET ORAL
Qty: 0 | Refills: 0 | COMMUNITY

## 2018-10-23 RX ORDER — CHOLECALCIFEROL (VITAMIN D3) 125 MCG
1 CAPSULE ORAL
Qty: 0 | Refills: 0 | COMMUNITY

## 2018-10-23 RX ORDER — CILOSTAZOL 100 MG/1
1 TABLET ORAL
Qty: 0 | Refills: 0 | COMMUNITY

## 2018-10-23 RX ORDER — DOCUSATE SODIUM 100 MG
1 CAPSULE ORAL
Qty: 90 | Refills: 0 | OUTPATIENT
Start: 2018-10-23 | End: 2018-11-21

## 2018-10-23 RX ORDER — TAMSULOSIN HYDROCHLORIDE 0.4 MG/1
1 CAPSULE ORAL
Qty: 30 | Refills: 0 | OUTPATIENT
Start: 2018-10-23 | End: 2018-11-21

## 2018-10-23 RX ORDER — SIMETHICONE 80 MG/1
1 TABLET, CHEWABLE ORAL
Qty: 60 | Refills: 0 | OUTPATIENT
Start: 2018-10-23 | End: 2018-11-21

## 2018-10-23 RX ORDER — MIRTAZAPINE 45 MG/1
0.5 TABLET, ORALLY DISINTEGRATING ORAL
Qty: 0 | Refills: 0 | COMMUNITY

## 2018-10-23 RX ORDER — LEVOTHYROXINE SODIUM 125 MCG
1 TABLET ORAL
Qty: 0 | Refills: 0 | COMMUNITY

## 2018-10-23 RX ORDER — MIRTAZAPINE 45 MG/1
1 TABLET, ORALLY DISINTEGRATING ORAL
Qty: 0 | Refills: 0 | COMMUNITY

## 2018-10-23 RX ORDER — DOCUSATE SODIUM 100 MG
1 CAPSULE ORAL
Qty: 30 | Refills: 0 | OUTPATIENT
Start: 2018-10-23 | End: 2018-11-21

## 2018-10-23 RX ORDER — LIDOCAINE 4 G/100G
1 CREAM TOPICAL
Qty: 30 | Refills: 0 | OUTPATIENT
Start: 2018-10-23 | End: 2018-11-21

## 2018-10-23 RX ORDER — LIDOCAINE 4 G/100G
1 CREAM TOPICAL
Qty: 0 | Refills: 0 | COMMUNITY

## 2018-10-23 RX ADMIN — LIDOCAINE 1 PATCH: 4 CREAM TOPICAL at 01:40

## 2018-10-23 RX ADMIN — HEPARIN SODIUM 5000 UNIT(S): 5000 INJECTION INTRAVENOUS; SUBCUTANEOUS at 13:33

## 2018-10-23 RX ADMIN — Medication 100 MILLIGRAM(S): at 05:24

## 2018-10-23 RX ADMIN — FINASTERIDE 5 MILLIGRAM(S): 5 TABLET, FILM COATED ORAL at 12:09

## 2018-10-23 RX ADMIN — LIDOCAINE 1 PATCH: 4 CREAM TOPICAL at 12:09

## 2018-10-23 RX ADMIN — HEPARIN SODIUM 5000 UNIT(S): 5000 INJECTION INTRAVENOUS; SUBCUTANEOUS at 05:24

## 2018-10-23 RX ADMIN — INFLUENZA VIRUS VACCINE 0.5 MILLILITER(S): 15; 15; 15; 15 SUSPENSION INTRAMUSCULAR at 13:32

## 2018-10-23 RX ADMIN — Medication 1 DROP(S): at 05:24

## 2018-10-23 RX ADMIN — PANTOPRAZOLE SODIUM 40 MILLIGRAM(S): 20 TABLET, DELAYED RELEASE ORAL at 05:24

## 2018-10-23 RX ADMIN — Medication 81 MILLIGRAM(S): at 12:09

## 2018-10-23 RX ADMIN — AMLODIPINE BESYLATE 5 MILLIGRAM(S): 2.5 TABLET ORAL at 05:24

## 2018-10-23 RX ADMIN — POLYETHYLENE GLYCOL 3350 17 GRAM(S): 17 POWDER, FOR SOLUTION ORAL at 12:09

## 2018-10-23 NOTE — PROGRESS NOTE ADULT - PROBLEM SELECTOR PLAN 3
pain meds
pain meds
-GUILLERMINA on CKD stage   -Creat came back to normal (baseline 1.1)  -Maybe 2/2 pre renal causes from dehydration and GI loss  -gentle hydration  -Monitor BMP

## 2018-10-23 NOTE — DISCHARGE NOTE ADULT - PLAN OF CARE
Treating the underlying condition You came here with low sodium. You were given ivf. Your sodium was corrected. Follow up with your Primary medical doctor within 1 week of discharge. You have a history of AAA. Follow up with your Vascular surgeon and monitor the AAA annually. You have a history of Arthritis. Follow up with your Primary medical doctor and continue the same medication regimen. You have a history of BPH. Follow up with your Primary medical doctor and continue the same medication regimen. You have a history of Haemarroids. Follow up with your Primary medical doctor and continue the same medication regimen. Control of blood pressure within normal range. You have a history of hypertension. We stopped lasix and lisinopril due to electrolyte abnormality and started on amlodipine. Your blood pressure has been well controlled. Continue with your medications as prescribed. You are advised to eat DASH diet. Please monitor your blood pressure daily, record it and take it to your primary doctor. Follow up with your Primary medical doctor. Please take lisinopril as your electrolyte abnormality is corrected.

## 2018-10-23 NOTE — PHYSICAL THERAPY INITIAL EVALUATION ADULT - DIAGNOSIS, PT EVAL
mildly increased activity tolerance and weakness mildly decreased aerobic endurance and generalized weakness

## 2018-10-23 NOTE — DISCHARGE NOTE ADULT - MEDICATION SUMMARY - MEDICATIONS TO STOP TAKING
I will STOP taking the medications listed below when I get home from the hospital:    ertapenem 1 g injectable powder for injection  -- 1 gram(s) injectable every 24 hours for 11 days via PICC line I will STOP taking the medications listed below when I get home from the hospital:    calcium carbonate 600 mg oral tablet  --  by mouth    lisinopril 5 mg oral tablet  -- 1 tab(s) by mouth once a day hold if sbp is less than 110    hydrochlorothiazide 12.5 mg oral capsule  -- 1 cap(s) by mouth once a day hold if sbp < 110    ertapenem 1 g injectable powder for injection  -- 1 gram(s) injectable every 24 hours for 11 days via PICC line    atorvastatin 10 mg oral tablet  -- 1 tab(s) by mouth once a day (at bedtime) I will STOP taking the medications listed below when I get home from the hospital:    calcium carbonate 600 mg oral tablet  --  by mouth    Robitussin 100 mg/5 mL oral liquid  -- 5 milliliter(s) by mouth every 4 hours, As Needed    lisinopril 5 mg oral tablet  -- 1 tab(s) by mouth once a day hold if sbp is less than 110    hydrochlorothiazide 12.5 mg oral capsule  -- 1 cap(s) by mouth once a day hold if sbp < 110    ertapenem 1 g injectable powder for injection  -- 1 gram(s) injectable every 24 hours for 11 days via PICC line    atorvastatin 10 mg oral tablet  -- 1 tab(s) by mouth once a day (at bedtime)    Robitussin 100 mg/5 mL oral liquid  -- 5 milliliter(s) by mouth 2 to 4 times a day, As Needed

## 2018-10-23 NOTE — DISCHARGE NOTE ADULT - HOSPITAL COURSE
Pt is an 89 y/o M, from Assisted living, w/ PMHx of Arthritis, BPH, Constipation, Hemorrhoids, and HTN who presents with daughter to the ED with abdominal pain.  Pt remains afebrile w/o leukocytosis.   Labs sig for Na of 127, and K+ of 2.4.  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 Pt was admitted to [facility] for electrolyte deficiency. He was given IVF. .Potassium was replaced. BMP is monitored every 6hrs. We stopped lasix and lisinopril and started on amlodipine. His creatinine came back to baseline. Electrolytes are corrected. Given patient's improved clinical status and current hemodynamic stability, decision was made to discharge. Discussed with attending  Please refer to patient's complete medical chart with documents for a full hospital course, for this is only a brief summary.

## 2018-10-23 NOTE — PHYSICAL THERAPY INITIAL EVALUATION ADULT - GENERAL OBSERVATIONS, REHAB EVAL
Alert, Awake, Oriented A&Ox3, IV access, received in chair, mild bleeding on flat sheet from hemorrhoid.

## 2018-10-23 NOTE — DISCHARGE NOTE ADULT - CARE PLAN
Principal Discharge DX:	Hyponatremia  Goal:	Treating the underlying condition  Assessment and plan of treatment:	You came here with low sodium. You were given ivf. Your sodium was corrected. Follow up with your Primary medical doctor within 1 week of discharge.  Secondary Diagnosis:	AAA (abdominal aortic aneurysm)  Assessment and plan of treatment:	You have a history of AAA. Follow up with your Vascular surgeon and monitor the AAA annually.  Secondary Diagnosis:	Arthritis  Assessment and plan of treatment:	You have a history of Arthritis. Follow up with your Primary medical doctor and continue the same medication regimen.  Secondary Diagnosis:	BPH (benign prostatic hyperplasia)  Assessment and plan of treatment:	You have a history of BPH. Follow up with your Primary medical doctor and continue the same medication regimen.  Secondary Diagnosis:	Hemorrhoids  Assessment and plan of treatment:	You have a history of Haemarroids. Follow up with your Primary medical doctor and continue the same medication regimen.  Secondary Diagnosis:	HTN (hypertension)  Goal:	Control of blood pressure within normal range.  Assessment and plan of treatment:	You have a history of hypertension. We stopped lasix and lisinopril due to electrolyte abnormality and started on amlodipine. Your blood pressure has been well controlled. Continue with your medications as prescribed. You are advised to eat DASH diet. Please monitor your blood pressure daily, record it and take it to your primary doctor. Follow up with your Primary medical doctor. Please take lisinopril as your electrolyte abnormality is corrected.

## 2018-10-23 NOTE — PROGRESS NOTE ADULT - PROBLEM SELECTOR PLAN 2
resolved
resolved
Resolving  -Likely from diuretic use   -Hold Hctz for now  -s/p K+ PO 40, and IV 10 x 3 in ED  -f/u AM potassium   -monitor BMP daily  -Tele monitoring

## 2018-10-23 NOTE — PROGRESS NOTE ADULT - PROBLEM SELECTOR PLAN 6
asymptomatic  periodic surveillance  vascular eval as out patient
asymptomatic  periodic surveillance  vascular eval as out patient
-CT abd sig for  Infrarenal AAA currently 3.8 cm, when compared to prior in 2016 which showed 3.4 cm.

## 2018-10-23 NOTE — DISCHARGE NOTE ADULT - ADDITIONAL INSTRUCTIONS
We stopped metolazone and lasix and started on amlodipine.  He has mild ckd with elevated creatinine  and reduced GFR 55. Monitor creatinine and visit the nephrologist.  We started on lidocaine for arthritis pain.  We started on hemorrhoidal topical cream for haemarroids.

## 2018-10-23 NOTE — PROGRESS NOTE ADULT - PROBLEM SELECTOR PLAN 1
resolved  renal eval
- c/w IV acetaminophen 100mg x 2 more doses, pt reports significant improvement   - lidocaine patch  - no NSAIDs, no need for opioids   - OOB, ambulate
improving  replace k   renal eval
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 Shore

## 2018-10-23 NOTE — PROGRESS NOTE ADULT - PROBLEM SELECTOR PLAN 5
amlodipine
amlodipine
-Holding Lisinopril and Hctz for now due to GUILLERMINA and Hypokalemia   -Will start Amlodipine 5 mg, and low dose hydralazine for now  -Resume lisinopril when clinically appropriate.

## 2018-10-23 NOTE — DISCHARGE NOTE ADULT - SECONDARY DIAGNOSIS.
AAA (abdominal aortic aneurysm) Arthritis BPH (benign prostatic hyperplasia) Hemorrhoids HTN (hypertension)

## 2018-10-23 NOTE — DISCHARGE NOTE ADULT - PATIENT PORTAL LINK FT
You can access the [facility], offered by [facility], by registering with the following website: [email or web address]

## 2018-10-23 NOTE — PROGRESS NOTE ADULT - SUBJECTIVE AND OBJECTIVE BOX
pt seen and examined.pts current chart reviewed and case discussed with resident covering.    SUBJECTIVE:  pt feels fine and denies cp,sob,gi or gu/uremic  symptons    REVIEW OF SYSTEMS:  CONSTITUTIONAL: No weakness, fevers or chills  EYES/ENT: No visual changes;  No vertigo or throat pain   NECK: No pain or stiffness  RESPIRATORY: No cough, wheezing, hemoptysis; No shortness of breath  CARDIOVASCULAR: No chest pain or palpitations  GASTROINTESTINAL: No abdominal or epigastric pain. No nausea, vomiting, or hematemesis; No diarrhea or constipation. No melena or hematochezia.  GENITOURINARY: No dysuria, frequency , hematuria, flank pain or nocturia  NEUROLOGICAL: No numbness or weakness  SKIN: No itching, burning, rashes, or lesions   All other review of systems is negative unless indicated above    Current meds:    acetaminophen   Tablet .. 650 milliGRAM(s) Oral every 6 hours PRN  amLODIPine   Tablet 5 milliGRAM(s) Oral daily  artificial tears (preservative free) Ophthalmic Solution 1 Drop(s) Both EYES two times a day  aspirin  chewable 81 milliGRAM(s) Oral daily  atorvastatin 10 milliGRAM(s) Oral at bedtime  docusate sodium 100 milliGRAM(s) Oral three times a day  finasteride 5 milliGRAM(s) Oral daily  heparin  Injectable 5000 Unit(s) SubCutaneous every 8 hours  lidocaine   Patch 1 Patch Transdermal daily  pantoprazole    Tablet 40 milliGRAM(s) Oral before breakfast  polyethylene glycol 3350 17 Gram(s) Oral daily  senna 2 Tablet(s) Oral at bedtime  tamsulosin 0.4 milliGRAM(s) Oral at bedtime      Vital Signs    T(F): 97.6 (10-23-18 @ 11:43), Max: 98.6 (10-22-18 @ 23:51)  HR: 81 (10-23-18 @ 12:28) (64 - 81)  BP: 152/68 (10-23-18 @ 12:28) (97/47 - 152/68)  ABP: --  RR: 18 (10-23-18 @ 12:28) (18 - 18)  SpO2: 97% (10-23-18 @ 12:28) (95% - 100%)  Wt(kg): --  CVP(cm H2O): --  CO: --  PCWP: --    I and O's:    10-21 @ 07:01  -  10-22 @ 07:00  --------------------------------------------------------  IN:  Total IN: 0 mL    OUT:    Stool: 1 mL    Voided: 1050 mL  Total OUT: 1051 mL    Total NET: -1051 mL        Daily     Daily Weight in k (23 Oct 2018 11:35)    PHYSICAL EXAM:  Constitutional: well developed, well nourished  and in nad  HEENT: PERRLA,  no icteric sclera and mild pallor of conjunctiva noted  Neck: No JVD, thyromegaly or adenopathy  Respiratory: reduced air entry lower lungs with no rales, wheezing or rhonchi  Cardiovascular: S1 and S2 normally heard  Gastrointestinal: soft, nondistended, nontender and normal bowel sounds heard  Extremities: No peripheral edema or cyanosis  Neurological: A/O x 3, no focal deficits  : No flank or cva tenderness palpated.  Skin: No rashes      LABS:    CBC:                          13.1   7.6   )-----------( 191      ( 23 Oct 2018 06:01 )             38.6           BMP:    10-23    138  |  101  |  14  ----------------------------<  97  3.5   |  32<H>  |  1.04  10-22    139  |  104  |  16  ----------------------------<  145<H>  3.8   |  27  |  1.24  10-22    140  |  103  |  15  ----------------------------<  105<H>  3.4<L>   |  30  |  1.00  10-21    137  |  99  |  17  ----------------------------<  176<H>  3.6   |  30  |  1.03  10-21    137  |  97  |  19<H>  ----------------------------<  181<H>  3.1<L>   |  33<H>  |  1.05  10-21    137  |  96  |  19<H>  ----------------------------<  162<H>  2.7<LL>   |  31  |  1.19  10-21    133<L>  |  93<L>  |  22<H>  ----------------------------<  107<H>  2.3<LL>   |  32<H>  |  0.99  10-21    132<L>  |  90<L>  |  24<H>  ----------------------------<  108<H>  2.3<LL>   |  31  |  1.11  10-21    127<L>  |  81<L>  |  30<H>  ----------------------------<  138<H>  2.4<LL>   |  35<H>  |  1.35<H>    Ca    8.6      23 Oct 2018 06:01  Ca    8.6      22 Oct 2018 17:27  Ca    8.2<L>      22 Oct 2018 07:01  Ca    8.2<L>      21 Oct 2018 23:57  Ca    8.1<L>      21 Oct 2018 18:56  Ca    8.2<L>      21 Oct 2018 12:45  Ca    7.5<L>      21 Oct 2018 06:45  Ca    7.6<L>      21 Oct 2018 05:40  Ca    8.5      21 Oct 2018 00:09  Phos  2.8     10-  Phos  2.1     10-  Phos  2.5     10-  Mg     2.5     10-23  Mg     2.6     10-  Mg     2.5     10-21    TPro  7.6  /  Alb  3.6  /  TBili  0.7  /  DBili  x   /  AST  37  /  ALT  29  /  AlkPhos  68  10-21      Thyroid Stimulating Hormone, Serum: 1.24 uU/mL (10-21 @ 05:40)      URINE STUDIES:        Sodium, Random Urine: 40 mmol/L (10-21 @ 05:40)  Osmolality, Random Urine: 164 mos/kg (10-21 @ 05:40)  Potassium, Random Urine: 3 mmol/L (10-21 @ 05:40)  Creatinine, Random Urine: <13 mg/dL (10-21 @ 05:40)  Chloride, Random Urine: 31 mmol/L (10-21 @ 05:40)                  RADIOLOGY & ADDITIONAL STUDIES: pt seen and examined.    SUBJECTIVE:  pt feels fine and denies cp,sob,gi or gu symptons    Current meds:    acetaminophen   Tablet .. 650 milliGRAM(s) Oral every 6 hours PRN  amLODIPine   Tablet 5 milliGRAM(s) Oral daily  artificial tears (preservative free) Ophthalmic Solution 1 Drop(s) Both EYES two times a day  aspirin  chewable 81 milliGRAM(s) Oral daily  atorvastatin 10 milliGRAM(s) Oral at bedtime  docusate sodium 100 milliGRAM(s) Oral three times a day  finasteride 5 milliGRAM(s) Oral daily  heparin  Injectable 5000 Unit(s) SubCutaneous every 8 hours  lidocaine   Patch 1 Patch Transdermal daily  pantoprazole    Tablet 40 milliGRAM(s) Oral before breakfast  polyethylene glycol 3350 17 Gram(s) Oral daily  senna 2 Tablet(s) Oral at bedtime  tamsulosin 0.4 milliGRAM(s) Oral at bedtime      Vital Signs    T(F): 97.6 (10-23-18 @ 11:43), Max: 98.6 (10-22-18 @ 23:51)  HR: 81 (10-23-18 @ 12:28) (64 - 81)  BP: 152/68 (10-23-18 @ 12:28) (97/47 - 152/68)  ABP: --  RR: 18 (10-23-18 @ 12:28) (18 - 18)  SpO2: 97% (10-23-18 @ 12:28) (95% - 100%)  Wt(kg): --  CVP(cm H2O): --  CO: --  PCWP: --    I and O's:    10-21 @ 07:01  -  10-22 @ 07:00  --------------------------------------------------------  IN:  Total IN: 0 mL    OUT:    Stool: 1 mL    Voided: 1050 mL  Total OUT: 1051 mL    Total NET: -1051 mL        Daily     Daily Weight in k (23 Oct 2018 11:35)    PHYSICAL EXAM:  Constitutional: well developed, obese  and in nad  HEENT: PERRLA,  no icteric sclera and mild pallor of conjunctiva noted  Neck: No JVD, thyromegaly or adenopathy  Respiratory: reduced air entry lower lungs with no rales, wheezing or rhonchi  Cardiovascular: S1 and S2 normally heard  Gastrointestinal: soft, obese-nondistended, nontender and normal bowel sounds heard  Extremities: No peripheral edema or cyanosis  Neurological: A/O x 3, no focal deficits  Skin: No rashes    LABS:    CBC:                          13.1   7.6   )-----------( 191      ( 23 Oct 2018 06:01 )             38.6           BMP:    10-23    138  |  101  |  14  ----------------------------<  97  3.5   |  32<H>  |  1.04  10-22    139  |  104  |  16  ----------------------------<  145<H>  3.8   |  27  |  1.24  10-22    140  |  103  |  15  ----------------------------<  105<H>  3.4<L>   |  30  |  1.00  10-21    137  |  99  |  17  ----------------------------<  176<H>  3.6   |  30  |  1.03  10-21    137  |  97  |  19<H>  ----------------------------<  181<H>  3.1<L>   |  33<H>  |  1.05  10-21    137  |  96  |  19<H>  ----------------------------<  162<H>  2.7<LL>   |  31  |  1.19  10-21    133<L>  |  93<L>  |  22<H>  ----------------------------<  107<H>  2.3<LL>   |  32<H>  |  0.99  10-21    132<L>  |  90<L>  |  24<H>  ----------------------------<  108<H>  2.3<LL>   |  31  |  1.11  10-21    127<L>  |  81<L>  |  30<H>  ----------------------------<  138<H>  2.4<LL>   |  35<H>  |  1.35<H>    Ca    8.6      23 Oct 2018 06:01  Ca    8.6      22 Oct 2018 17:27  Ca    8.2<L>      22 Oct 2018 07:01  Ca    8.2<L>      21 Oct 2018 23:57  Ca    8.1<L>      21 Oct 2018 18:56  Ca    8.2<L>      21 Oct 2018 12:45  Ca    7.5<L>      21 Oct 2018 06:45  Ca    7.6<L>      21 Oct 2018 05:40  Ca    8.5      21 Oct 2018 00:09  Phos  2.8     10-23  Phos  2.1     10-22  Phos  2.5     10-21  Mg     2.5     10-23  Mg     2.6     10-  Mg     2.5     10-21    TPro  7.6  /  Alb  3.6  /  TBili  0.7  /  DBili  x   /  AST  37  /  ALT  29  /  AlkPhos  68  10-21      Thyroid Stimulating Hormone, Serum: 1.24 uU/mL (10-21 @ 05:40)      URINE STUDIES:        Sodium, Random Urine: 40 mmol/L (10-21 @ 05:40)  Osmolality, Random Urine: 164 mos/kg (10-21 @ 05:40)  Potassium, Random Urine: 3 mmol/L (10-21 @ 05:40)  Creatinine, Random Urine: <13 mg/dL (10-21 @ 05:40)  Chloride, Random Urine: 31 mmol/L (10-21 @ 05:40)

## 2018-10-23 NOTE — PHYSICAL THERAPY INITIAL EVALUATION ADULT - SOCIAL CONCERNS
Pt. reported his concern that he wants to go back to daughter's house instead of assisted living facility. However, his son, who holds a Power of , wants him to stay in Assisted living facility. Patient expressed that he wants to live with his daughter; RN case manager Jyoti made aware that patient may need a social work consult

## 2018-10-23 NOTE — PROGRESS NOTE ADULT - PROBLEM SELECTOR PLAN 4
proscar
proscar
-Severe Rt hip arthrosis   -Likely the cause of patients difficulty ambulating   Pain management was consulted  Lidocaine patch  F/u PT Evaluation  -Pain mgmt consult  -Pt may benefit from ortho consult for hip replacement surgery

## 2018-10-23 NOTE — PROGRESS NOTE ADULT - SUBJECTIVE AND OBJECTIVE BOX
AALIYAH KELLY  [id number]    Patient is a 89y old  Male who presents with a chief complaint of abd pain (21 Oct 2018 13:45)      patient seen and examined    s doing ok , no abd pain , walking well with walker  .MEDICATIONS  (STANDING):  amLODIPine   Tablet 5 milliGRAM(s) Oral daily  artificial tears (preservative free) Ophthalmic Solution 1 Drop(s) Both EYES two times a day  aspirin  chewable 81 milliGRAM(s) Oral daily  atorvastatin 10 milliGRAM(s) Oral at bedtime  docusate sodium 100 milliGRAM(s) Oral three times a day  finasteride 5 milliGRAM(s) Oral daily  heparin  Injectable 5000 Unit(s) SubCutaneous every 8 hours  influenza   Vaccine 0.5 milliLiter(s) IntraMuscular once  lidocaine   Patch 1 Patch Transdermal daily  pantoprazole    Tablet 40 milliGRAM(s) Oral before breakfast  polyethylene glycol 3350 17 Gram(s) Oral daily  senna 2 Tablet(s) Oral at bedtime  tamsulosin 0.4 milliGRAM(s) Oral at bedtime    MEDICATIONS  (PRN):  acetaminophen   Tablet .. 650 milliGRAM(s) Oral every 6 hours PRN Moderate Pain (4 - 6)        Allergies    No Known Allergies    Intolerances        PAST MEDICAL & SURGICAL HISTORY:  Hemorrhoids  Arthritis  HTN (hypertension)  BPH (benign prostatic hyperplasia)  No significant past surgical history      FAMILY HISTORY:  Family history of heart disease (Father)      SOCIAL HISTORY  Smoking History:     REVIEW OF SYSTEMS:    CONSTITUTIONAL:  Fevers [  ]  Yes  [ x ]  No                                   chills [  ]  Yes  [x  ]  No                               sweats  [  ]  Yes  [ x ]  No                                fatigue [ x ]  Yes  [  ]  No    HEENT:  Eyes:  Diplopia or blurred vision [  ]  Yes  [ x ]  No    ENT:                        earache  [  ]  Yes  [x  ]  No                             sore throat  [  ]  Yes  [x  ]  No                            runny nose.  [  ]  Yes  [ x ]  No    CARDIOVASCULAR:       chest pain  [  ]  Yes  [x  ]  No                        squeezing, tightness,  [  ]  Yes  [ x ]  No                                      palpitations.  [  ]  Yes  [x  ]  No    RESPIRATORY:             Cough [  ]  Yes  [x  ]  No                                  Wheezing [  ]  Yes  [x  ]  No                                Hemoptysis [  ]  Yes  [  x]  No                                      Sputum [  ]  Yes  [ x ]  No                   Shortness of Breathe [  ]  Yes  [ x ]  No    GASTROINTESTINAL:      Abdominal pain  [  ]  Yes  [x  ]  No                                                    Nausea  [  ]  Yes  [x  ]  No                                                   Vomiting [  ]  Yes  [x  ]  No                                              Constipation [ x ]  Yes  [  ]  No                                                    Diarrhea [  ]  Yes  [x  ]  No    GENITOURINARY:           Incontinence x[  ]  Yes  [  ]  No                                                Dysuria [  ]  Yes  [ x ]  No                                      Blood in Urine  [  ]  Yes  [x  ]  No                          Frequency or urgency.  [  ]  Yes  [ x ]  No    NEUROLOGIC:                     Paresthesias  [  ]  Yes  [x  ]  No                                             fasciculations  [  ]  Yes  [x  ]  No                                seizures or weakness.  [  ]  Yes  [  x]  No                                                   Headache [  ]  Yes  [ x ]  No                                  Loss of Conciousness [  ]  Yes  [xx  ]  No                                                     Insomnia [  ]  Yes  [ x ]  No    PSYCHIATRIC:    Disorder of thought or mood.  [x  ]  Yes  [  ]  No                                                           Anxiety [  ]  Yes  [  ]  No                                                      Depression [  ]  Yes  [  ]  No                                                         Dementia [x  ]  Yes  [  ]  No axox2  .  .Vital Signs Last 24 Hrs  T(C): 36.4 (23 Oct 2018 11:43), Max: 37 (22 Oct 2018 23:51)  T(F): 97.6 (23 Oct 2018 11:43), Max: 98.6 (22 Oct 2018 23:51)  HR: 71 (23 Oct 2018 11:43) (64 - 78)  BP: 139/72 (23 Oct 2018 11:43) (97/47 - 139/72)  BP(mean): --  RR: 18 (23 Oct 2018 11:43) (18 - 18)  SpO2: 98% (23 Oct 2018 11:43) (95% - 100%)      PHYSICAL EXAMINATION:    GENERAL: The patient is a well-developed, well-nourished _____in no apparent distress.     HEENT: Head is normocephalic and atraumatic. Extraocular muscles are intact. Mucous membranes are moist.     NECK: Supple. jvd [  ]  Yes  [x  ]  No    LUNGS: Clear to auscultation  [x  ]  Yes  [  ]  No                               wheezing, [  ]  Yes  [x  ]  No                                      rales  [  ]  Yes  [x  ]  No                                    rhonchi  [  ]  Yes  [x  ]  No                 Respirations labored  [  ]  Yes  x[  ]  No    HEART: Regular rate and rhythm  [  ]  Yes  [  x]  No                                        Murmur [  ]  Yes  [x  ]  No                                              rub  [  ]  Yes  [x  ]  No                                          gallop  [  ]  Yes  [x  ]  No    ABDOMEN:                                 Soft, [x  ]  Yes  [  ]  No                                             nontender [  ]  Yes  [ x ]  No                                             distended.[  ]  Yes  [x  ]  No                            hepatosplenomegaly ,[  ]  Yes  [x  ]  No                                                    BS   [ x ]  Yes  [  ]  No    EXTREMITIES:                cyanosis, [  ]  Yes  [x  ]  No                                       clubbing,   [  ]  Yes  [ x ]  No                                               rash, [  ]  Yes  [ x ]  No                                           edema.  [  ]  Yes  [  x]  No    NEUROLOGIC: Alert and Oriented  [ x ] Person [  ] Time  [x ] Place                                    Cranial nerves intact    [  ]  Yes  [  ]  No                                               sensory Intact  [  ]  Yes  [  ]  No                                                  motor WNL   [  ]  Yes  [  ]  No                                                Chandrakant Paresis  [  ]  Yes  [ x ]  No  DTR  babinski+ or -  LABS:                        13.1   7.6   )-----------( 191      ( 23 Oct 2018 06:01 )             38.6     10-23    138  |  101  |  14  ----------------------------<  97  3.5   |  32<H>  |  1.04    Ca    8.6      23 Oct 2018 06:01  Phos  2.8     10-23  Mg     2.5     10-                              LABS:                        12.6   6.8   )-----------( 177      ( 22 Oct 2018 07:01 )             37.5     10-22    140  |  103  |  15  ----------------------------<  105<H>  3.4<L>   |  30  |  1.00    Ca    8.2<L>      22 Oct 2018 07:01  Phos  2.1     10-22  Mg     2.6     10-    TPro  7.6  /  Alb  3.6  /  TBili  0.7  /  DBili  x   /  AST  37  /  ALT  29  /  AlkPhos  68  10-21      Urinalysis Basic - ( 21 Oct 2018 00:09 )    Color: Yellow / Appearance: Clear / S.010 / pH: x  Gluc: x / Ketone: Negative  / Bili: Negative / Urobili: Negative   Blood: x / Protein: 15 / Nitrite: Negative   Leuk Esterase: Negative / RBC: 2-5 /HPF / WBC 0-2 /HPF   Sq Epi: x / Non Sq Epi: x / Bacteria: Trace /HPF                      MICROBIOLOGY:    RADIOLOGY & ADDITIONAL STUDIES:  < from: CT Abdomen and Pelvis w/ IV Cont (10.21.18 @ 03:55) >  IMPRESSION:     No bowel obstruction. Normal appendix.    Chronic diverticulosis withoutdiverticulitis.    CBD stent in place.    Infrarenal abdominal aortic aneurysm measuring up to 3.8 cm, previously   3.4 cm on 2016.      < end of copied text >    CXR:    Ct scan chest:    ekg;    echo:

## 2018-10-23 NOTE — PROGRESS NOTE ADULT - ASSESSMENT
A/P:  1.HYPONATREMIA: r/o secondary to high adh state secondary to chronic Rt HIP/back pain plus excess po liquid intake plus Hctz induced , now Na is normal but Na has been corrected too fast  -suggest to lower Na level  and keep Na below  143 by midnight  -add d5w at 100 ml per hr x 6 hrs and readjust as per Na level   -please do not correct Na level >8 meq in 24 hrs  -f/u Na level q 6 hrs x 24 hrs   2.HYPOKALEMIA: r/o secondary to Hctz  -suggest to replace po and iv kcl prn  -keep k >4 and <5  -f/u k level daily  3.Alkalosis:Mild, r/o secondary to caco3, now stable  -avoid caco3  -f/u co2 level daily  4.HTN: bp is acceptble  -continue the current bp meds  -keep bp<140/80  5.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 A/P:  1.HYPONATREMIA: r/o secondary to high adh state secondary to chronic Rt HIP/back pain , now Na is normal and at baseline  -ok for discharge   2.HYPOKALEMIA: now improved  -keep k >4 and <5  -f/u k level daily  3.Alkalosis:Mild, r/o secondary to caco3, now stable  -avoid caco3  -f/u co2 level daily  4.HTN: bp is high  -add Amlodipine 5 mg daily  -low salt diet  -keep bp<140/80  5.GUILLERMINA ON CKD :r/o stage 2 ?   -now renal function is stable and may be at his baseline  -f/u bmp at pcp office in 1 week and needs renal f/u if egfr still low  -f/u urine for pr/cr ratio as outpatient   6.RENAL CYSTS: bilateral, r/o secondary to benign   -no intervention needed

## 2018-11-11 PROCEDURE — 82962 GLUCOSE BLOOD TEST: CPT

## 2018-11-11 PROCEDURE — 84443 ASSAY THYROID STIM HORMONE: CPT

## 2018-11-11 PROCEDURE — 90686 IIV4 VACC NO PRSV 0.5 ML IM: CPT

## 2018-11-11 PROCEDURE — 82436 ASSAY OF URINE CHLORIDE: CPT

## 2018-11-11 PROCEDURE — 83605 ASSAY OF LACTIC ACID: CPT

## 2018-11-11 PROCEDURE — 84133 ASSAY OF URINE POTASSIUM: CPT

## 2018-11-11 PROCEDURE — 80053 COMPREHEN METABOLIC PANEL: CPT

## 2018-11-11 PROCEDURE — 84100 ASSAY OF PHOSPHORUS: CPT

## 2018-11-11 PROCEDURE — 83735 ASSAY OF MAGNESIUM: CPT

## 2018-11-11 PROCEDURE — 83036 HEMOGLOBIN GLYCOSYLATED A1C: CPT

## 2018-11-11 PROCEDURE — 80061 LIPID PANEL: CPT

## 2018-11-11 PROCEDURE — 83690 ASSAY OF LIPASE: CPT

## 2018-11-11 PROCEDURE — 85027 COMPLETE CBC AUTOMATED: CPT

## 2018-11-11 PROCEDURE — 82607 VITAMIN B-12: CPT

## 2018-11-11 PROCEDURE — 99285 EMERGENCY DEPT VISIT HI MDM: CPT | Mod: 25

## 2018-11-11 PROCEDURE — 83935 ASSAY OF URINE OSMOLALITY: CPT

## 2018-11-11 PROCEDURE — 80048 BASIC METABOLIC PNL TOTAL CA: CPT

## 2018-11-11 PROCEDURE — 36415 COLL VENOUS BLD VENIPUNCTURE: CPT

## 2018-11-11 PROCEDURE — 74177 CT ABD & PELVIS W/CONTRAST: CPT

## 2018-11-11 PROCEDURE — 93005 ELECTROCARDIOGRAM TRACING: CPT

## 2018-11-11 PROCEDURE — 84300 ASSAY OF URINE SODIUM: CPT

## 2018-11-11 PROCEDURE — 81001 URINALYSIS AUTO W/SCOPE: CPT

## 2018-11-11 PROCEDURE — 82570 ASSAY OF URINE CREATININE: CPT

## 2018-11-11 PROCEDURE — 83930 ASSAY OF BLOOD OSMOLALITY: CPT

## 2021-06-15 NOTE — H&P ADULT - PMH
Arthritis    BPH (benign prostatic hyperplasia)    Hemorrhoids    HTN (hypertension)
Immediate family member

## 2023-07-24 NOTE — PHYSICAL THERAPY INITIAL EVALUATION ADULT - GAIT PATTERN USED, PT EVAL
2-point gait Tazorac Counseling:  Patient advised that medication is irritating and drying.  Patient may need to apply sparingly and wash off after an hour before eventually leaving it on overnight.  The patient verbalized understanding of the proper use and possible adverse effects of tazorac.  All of the patient's questions and concerns were addressed.

## 2024-04-24 NOTE — PATIENT PROFILE ADULT - LANGUAGE ASSISTANCE NEEDED
Yes-Patient/Caregiver accepts free interpretation services... Render In Strict Bullet Format?: No Detail Level: Zone Initiate Treatment: Winlevi

## 2024-11-14 NOTE — DISCHARGE NOTE ADULT - MEDICATION SUMMARY - MEDICATIONS TO TAKE
[FreeTextEntry2] : b/l legs pain I will START or STAY ON the medications listed below when I get home from the hospital:    finasteride 5 mg oral tablet  -- 1 tab(s) by mouth once a day  -- Indication: For BPH (benign prostatic hyperplasia)    aspirin 81 mg oral tablet, chewable  -- 1 tab(s) by mouth once a day  -- Indication: For Cardioprotective    lisinopril 5 mg oral tablet  -- 1 tab(s) by mouth once a day hold if sbp is less than 110  -- Indication: For Hypertension    hydrocortisone-pramoxine 1%-1% rectal cream  -- 1 applicatorful rectally once a day   -- For rectal use only.    -- Indication: For Haemarroids    calcium carbonate 600 mg oral tablet  --  by mouth   -- Indication: For Nutritional support    tamsulosin 0.4 mg oral capsule  --  by mouth once a day (at bedtime)  -- Indication: For BPH (benign prostatic hyperplasia)    atorvastatin 10 mg oral tablet  -- 1 tab(s) by mouth once a day (at bedtime)  -- Indication: For Hyperlipidemia    lidocaine 0.5% topical cream  -- Apply on skin to affected area once a day (at bedtime)  -- Indication: For Pain    Robitussin 100 mg/5 mL oral liquid  -- 5 milliliter(s) by mouth every 4 hours, As Needed  -- Indication: For cough    docusate sodium 100 mg oral capsule  -- 1 cap(s) by mouth 3 times a day  -- Indication: For constipation    simethicone 125 mg oral tablet  -- 1 tab(s) by mouth 2 times a day (after meals)  -- Indication: For constipation    ocular lubricant ophthalmic solution  -- 1 drop(s) to each affected eye 2 times a day  -- Indication: For Eye lubricants    pantoprazole 40 mg oral delayed release tablet  -- 1 tab(s) by mouth once a day (before a meal)  -- Indication: For GERD    oxybutynin 10 mg/24 hr oral tablet, extended release  --  by mouth   -- Indication: For urinary incontinence I will START or STAY ON the medications listed below when I get home from the hospital:    finasteride 5 mg oral tablet  -- 1 tab(s) by mouth once a day  -- Indication: For BPH (benign prostatic hyperplasia)    aspirin 81 mg oral tablet, chewable  -- 1 tab(s) by mouth once a day  -- Indication: For Cardioprotective    hydrocortisone-pramoxine 1%-1% rectal cream  -- 1 applicatorful rectally once a day   -- For rectal use only.    -- Indication: For Haemarroids    tamsulosin 0.4 mg oral capsule  -- 1 application by mouth once a day   -- Indication: For BPH (benign prostatic hyperplasia)    mirtazapine 45 mg oral tablet  -- 1 tab(s) by mouth once a day (at bedtime)  -- Indication: For DEPRESSION    Claritin 10 mg oral tablet  -- 1 tab(s) by mouth once a day  -- Indication: For SEAsonal allergies    amLODIPine 5 mg oral tablet  -- 1 tab(s) by mouth once a day  -- Indication: For HTypertension    lidocaine 0.5% topical cream  -- Apply on skin to affected area once a day (at bedtime)  -- Indication: For pain    Robitussin 100 mg/5 mL oral liquid  -- 5 milliliter(s) by mouth 2 to 4 times a day, As Needed  -- Indication: For cough    docusate sodium 100 mg oral capsule  -- 1 cap(s) by mouth 3 times a day  -- Indication: For constipation    simethicone 125 mg oral tablet  -- 1 tab(s) by mouth 2 times a day (after meals)  -- Indication: For constipation    ocular lubricant ophthalmic solution  -- 1 drop(s) to each affected eye 2 times a day  -- Indication: For dry eyes    oxybutynin 10 mg/24 hr oral tablet, extended release  --  by mouth   -- Indication: For urinary incontinence I will START or STAY ON the medications listed below when I get home from the hospital:    finasteride 5 mg oral tablet  -- 1 tab(s) by mouth once a day  -- Indication: For BPH (benign prostatic hyperplasia)    aspirin 81 mg oral tablet, chewable  -- 1 tab(s) by mouth once a day  -- Indication: For Cardioprotective    hydrocortisone-pramoxine 1%-1% rectal cream  -- 1 applicatorful rectally once a day   -- For rectal use only.    -- Indication: For Haemarroids    tamsulosin 0.4 mg oral capsule  -- 1 application by mouth once a day   -- Indication: For BPH (benign prostatic hyperplasia)    mirtazapine 45 mg oral tablet  -- 0.5 tab(s) by mouth once a day (at bedtime)  -- Indication: For depression    Claritin 10 mg oral tablet  -- 1 tab(s) by mouth once a day  -- Indication: For SEAsonal allergies    amLODIPine 5 mg oral tablet  -- 1 tab(s) by mouth once a day  -- Indication: For HTypertension    lidocaine 0.5% topical cream  -- Apply on skin to affected area once a day (at bedtime)  -- Indication: For pain    Robitussin 100 mg/5 mL oral liquid  -- 5 milliliter(s) by mouth 2 to 4 times a day, As Needed  -- Indication: For cough    docusate sodium 100 mg oral capsule  -- 1 cap(s) by mouth once a day (at bedtime)   -- Indication: For constipation    Senna 8.6 mg oral tablet  -- 2 tab(s) by mouth once a day (at bedtime)  -- Indication: For constipation    cilostazol 100 mg oral tablet  -- 1 tab(s) by mouth 2 times a day  -- Indication: For PAD    simethicone 125 mg oral tablet  -- 1 tab(s) by mouth 2 times a day (after meals)  -- Indication: For CONSTIPATION    ocular lubricant ophthalmic solution  -- 1 drop(s) to each affected eye 2 times a day  -- Indication: For DRY EYES    Synthroid 25 mcg (0.025 mg) oral tablet  -- 1 tab(s) by mouth once a day at 8am  -- Indication: For HypoTHYROID    oxybutynin 10 mg/24 hr oral tablet, extended release  --  by mouth   -- Indication: For urinary incontinence    Vitamin D3 1000 intl units oral tablet  -- 1 tab(s) by mouth once a day  -- Indication: For SUPPLIMENT